# Patient Record
Sex: FEMALE | Race: WHITE | NOT HISPANIC OR LATINO | Employment: FULL TIME | URBAN - METROPOLITAN AREA
[De-identification: names, ages, dates, MRNs, and addresses within clinical notes are randomized per-mention and may not be internally consistent; named-entity substitution may affect disease eponyms.]

---

## 2023-02-28 PROBLEM — N91.2 AMENORRHEA: Status: ACTIVE | Noted: 2023-02-28

## 2023-02-28 PROBLEM — G43.909 MIGRAINE: Status: ACTIVE | Noted: 2023-02-28

## 2023-02-28 PROBLEM — N92.6 IRREGULAR BLEEDING: Status: ACTIVE | Noted: 2023-02-28

## 2023-02-28 PROBLEM — K58.9 IRRITABLE BOWEL SYNDROME (IBS): Status: ACTIVE | Noted: 2023-02-28

## 2023-02-28 PROBLEM — J30.9 ALLERGIC RHINITIS: Status: ACTIVE | Noted: 2023-02-28

## 2023-02-28 PROBLEM — L70.0 ACNE COMEDONE: Status: ACTIVE | Noted: 2023-02-28

## 2023-02-28 PROBLEM — F41.1 GENERALIZED ANXIETY DISORDER WITH PANIC ATTACKS: Status: ACTIVE | Noted: 2023-02-28

## 2023-02-28 PROBLEM — J02.9 SORE THROAT: Status: ACTIVE | Noted: 2023-02-28

## 2023-02-28 PROBLEM — H61.21 IMPACTED CERUMEN OF RIGHT EAR: Status: ACTIVE | Noted: 2023-02-28

## 2023-02-28 PROBLEM — L25.9 DERMATITIS VENENATA: Status: ACTIVE | Noted: 2023-02-28

## 2023-02-28 PROBLEM — G43.809 MIGRAINE VARIANT: Status: ACTIVE | Noted: 2023-02-28

## 2023-02-28 PROBLEM — F41.0 GENERALIZED ANXIETY DISORDER WITH PANIC ATTACKS: Status: ACTIVE | Noted: 2023-02-28

## 2023-02-28 PROBLEM — F32.A DEPRESSION: Status: ACTIVE | Noted: 2023-02-28

## 2023-02-28 PROBLEM — N93.9 ABNORMAL UTERINE BLEEDING: Status: ACTIVE | Noted: 2023-02-28

## 2023-02-28 PROBLEM — L23.5 ALLERGIC CONTACT DERMATITIS DUE TO OTHER CHEMICAL PRODUCTS: Status: ACTIVE | Noted: 2023-02-28

## 2023-02-28 PROBLEM — R41.840 ATTENTION DEFICIT: Status: ACTIVE | Noted: 2023-02-28

## 2023-02-28 PROBLEM — N92.0 MENORRHAGIA: Status: ACTIVE | Noted: 2023-02-28

## 2023-03-16 ENCOUNTER — OFFICE VISIT (OUTPATIENT)
Dept: PRIMARY CARE | Facility: CLINIC | Age: 23
End: 2023-03-16
Payer: COMMERCIAL

## 2023-03-16 VITALS
SYSTOLIC BLOOD PRESSURE: 102 MMHG | HEIGHT: 66 IN | DIASTOLIC BLOOD PRESSURE: 78 MMHG | RESPIRATION RATE: 14 BRPM | TEMPERATURE: 98.5 F | WEIGHT: 130 LBS | BODY MASS INDEX: 20.89 KG/M2 | OXYGEN SATURATION: 97 % | HEART RATE: 100 BPM

## 2023-03-16 DIAGNOSIS — S39.012A STRAIN OF LUMBAR REGION, INITIAL ENCOUNTER: Primary | ICD-10-CM

## 2023-03-16 PROCEDURE — 99213 OFFICE O/P EST LOW 20 MIN: CPT | Performed by: INTERNAL MEDICINE

## 2023-03-16 PROCEDURE — 1036F TOBACCO NON-USER: CPT | Performed by: INTERNAL MEDICINE

## 2023-03-16 RX ORDER — NAPROXEN 500 MG/1
500 TABLET ORAL 2 TIMES DAILY PRN
Qty: 60 TABLET | Refills: 0 | Status: SHIPPED | OUTPATIENT
Start: 2023-03-16 | End: 2023-04-15

## 2023-03-16 RX ORDER — CYCLOBENZAPRINE HCL 5 MG
5 TABLET ORAL NIGHTLY PRN
Qty: 30 TABLET | Refills: 0 | Status: SHIPPED | OUTPATIENT
Start: 2023-03-16 | End: 2023-04-15

## 2023-03-16 ASSESSMENT — ENCOUNTER SYMPTOMS
ABDOMINAL PAIN: 0
WEAKNESS: 0
LEG PAIN: 1
PARESTHESIAS: 1
PERIANAL NUMBNESS: 0
NUMBNESS: 0
FEVER: 0
DYSURIA: 0
WEIGHT LOSS: 0
BACK PAIN: 1
BOWEL INCONTINENCE: 0
HEADACHES: 0
TINGLING: 1
PARESIS: 0

## 2023-03-16 NOTE — PROGRESS NOTES
"Katerin Costa is a 22 y.o. female who presents for C/O LOWER BACK PAIN THAT SHOOTS DOWN R LEG X 2 MONTHS   C/O COUGH SORE THROAT X 1 WK DENIES FEVER       Back Pain  This is a recurrent problem. The current episode started more than 1 month ago. The problem occurs daily. The problem has been waxing and waning since onset. The pain is present in the lumbar spine. The quality of the pain is described as shooting and stabbing. The pain radiates to the right foot and right thigh. The pain is at a severity of 10/10. The pain is The same all the time. The symptoms are aggravated by bending, coughing, position, lying down, sitting, standing and twisting. Stiffness is present All day. Associated symptoms include leg pain, paresthesias and tingling. Pertinent negatives include no abdominal pain, bladder incontinence, bowel incontinence, chest pain, dysuria, fever, headaches, numbness, paresis, pelvic pain, perianal numbness, weakness or weight loss.      FEW MONTHS AGO, STARTED GETTING PAINS SHOOTING DOWN LEG, RIGHT LEG    NOT CONSISTENT    MOSTLY JUST LOWER BACK BUT IF GET BAD IT SHOOTS DOWN THE LEG, NO REAL WEAKNESS    NO INJURY OR HEAVY LIFTING    TAKEN ADVIL, AND EVERYTHING OTC.  WAKES HER UP AT NIGHT, HEATING PAD NOT TO EFFECTIVE    TAKE NO MEDS REGULARLY        GOING TO [place] VACATION IN 1 MONTH      Review of Systems   Constitutional:  Negative for fever and weight loss.   Cardiovascular:  Negative for chest pain.   Gastrointestinal:  Negative for abdominal pain and bowel incontinence.   Genitourinary:  Negative for bladder incontinence, dysuria and pelvic pain.   Musculoskeletal:  Positive for back pain.   Neurological:  Positive for tingling and paresthesias. Negative for weakness, numbness and headaches.       Objective   /78   Pulse 100   Temp 36.9 °C (98.5 °F)   Resp 14   Ht 1.676 m (5' 6\")   Wt 59 kg (130 lb)   SpO2 97%   BMI 20.98 kg/m²     Physical Exam  Constitutional:  "      General: She is not in acute distress.     Appearance: Normal appearance. She is not ill-appearing.   HENT:      Head: Normocephalic and atraumatic.      Mouth/Throat:      Mouth: Mucous membranes are moist.   Eyes:      Pupils: Pupils are equal, round, and reactive to light.   Neck:      Vascular: No carotid bruit.   Cardiovascular:      Rate and Rhythm: Normal rate and regular rhythm.      Pulses: Normal pulses.      Heart sounds: No murmur heard.     No gallop.   Pulmonary:      Effort: Pulmonary effort is normal. No respiratory distress.      Breath sounds: Normal breath sounds. No wheezing, rhonchi or rales.   Abdominal:      General: Bowel sounds are normal.      Palpations: Abdomen is soft.      Tenderness: There is no abdominal tenderness. There is no guarding or rebound.   Musculoskeletal:      Cervical back: Neck supple.      Right lower leg: No edema.      Left lower leg: No edema.   Skin:     General: Skin is warm and dry.      Findings: No rash.   Neurological:      General: No focal deficit present.      Mental Status: She is oriented to person, place, and time.      Motor: No weakness.      Coordination: Coordination normal.      Gait: Gait normal.      Deep Tendon Reflexes: Reflexes normal.      Comments: Bilateral lower extr 5/5 motor, 2+ and symmetric reflexes.  Mild muscle spasm in low back musculature   Psychiatric:         Mood and Affect: Mood normal.         Behavior: Behavior normal.         Assessment/Plan   Problem List Items Addressed This Visit          Musculoskeletal    Low back strain - Primary    Relevant Medications    naproxen (Naprosyn) 500 mg tablet    cyclobenzaprine (Flexeril) 5 mg tablet    Other Relevant Orders    XR lumbar spine 2-3 views     Patient Instructions    I SUSPECT THAT A LOW BACK STRAIN HAS GOTTEN OUT OF HAND.  I SUSPECT IT IS PUSHING ON A NERVE CAUSING THE PAIN DOWN THE LEG    2.  ABSENCE OF WEAKNESS AND NORMAL STRENGTH MEANS RISK OF DISC HERNIATION IS QUITE  LOW    4.  WILL CHECK AM XRAY TO MAKE SURE YOUR BACK LOOKS NORMAL    5.  ANTIINFLAMMATORY AND MUSCLE RELAXERS TO TAKE AT NIGHT ARE ORDERED    6.  IF YOUR NOT GETTING RELIEF IN A WEEK OR TWO, PLEASE LET ME KNOW AND WILL CONSIDER ONE OR MORE OF : STEROID TAPER, CHIROPRACTIC, PHYSICAL THERAPY - SO THAT WE CAN GET YOU IN GOOD SHAPE FOR YOUR TRIP    7.  MOST LIKELY THE CAUSE IS YOUR WORK STATION SO MAKE SURE TO RAISE CLIENT CHAIR AS HIGH AS NEEDED TO KEEP YOUR BACK STRAIGHT    8.  FOLLOW UP AS NEEDED.    9.  REGULAR GYN EXAMS ARE RECOMMEDNED

## 2023-03-16 NOTE — PATIENT INSTRUCTIONS
I SUSPECT THAT A LOW BACK STRAIN HAS GOTTEN OUT OF HAND.  I SUSPECT IT IS PUSHING ON A NERVE CAUSING THE PAIN DOWN THE LEG    2.  ABSENCE OF WEAKNESS AND NORMAL STRENGTH MEANS RISK OF DISC HERNIATION IS QUITE LOW    4.  WILL CHECK AM XRAY TO MAKE SURE YOUR BACK LOOKS NORMAL    5.  ANTIINFLAMMATORY AND MUSCLE RELAXERS TO TAKE AT NIGHT ARE ORDERED    6.  IF YOUR NOT GETTING RELIEF IN A WEEK OR TWO, PLEASE LET ME KNOW AND WILL CONSIDER ONE OR MORE OF : STEROID TAPER, CHIROPRACTIC, PHYSICAL THERAPY - SO THAT WE CAN GET YOU IN GOOD SHAPE FOR YOUR TRIP    7.  MOST LIKELY THE CAUSE IS YOUR WORK STATION SO MAKE SURE TO RAISE CLIENT CHAIR AS HIGH AS NEEDED TO KEEP YOUR BACK STRAIGHT    8.  FOLLOW UP AS NEEDED.    9.  REGULAR GYN EXAMS ARE RECOMMEDNED

## 2023-05-17 LAB
ERYTHROCYTE DISTRIBUTION WIDTH (RATIO) BY AUTOMATED COUNT: 11.9 % (ref 11.5–14.5)
ERYTHROCYTE MEAN CORPUSCULAR HEMOGLOBIN CONCENTRATION (G/DL) BY AUTOMATED: 32.4 G/DL (ref 32–36)
ERYTHROCYTE MEAN CORPUSCULAR VOLUME (FL) BY AUTOMATED COUNT: 92 FL (ref 80–100)
ERYTHROCYTES (10*6/UL) IN BLOOD BY AUTOMATED COUNT: 4.27 X10E12/L (ref 4–5.2)
HEMATOCRIT (%) IN BLOOD BY AUTOMATED COUNT: 39.2 % (ref 36–46)
HEMOGLOBIN (G/DL) IN BLOOD: 12.7 G/DL (ref 12–16)
LEUKOCYTES (10*3/UL) IN BLOOD BY AUTOMATED COUNT: 9.6 X10E9/L (ref 4.4–11.3)
PLATELETS (10*3/UL) IN BLOOD AUTOMATED COUNT: 248 X10E9/L (ref 150–450)
REFLEX ADDED, ANEMIA PANEL: NORMAL

## 2023-05-18 LAB
ABO GROUP (TYPE) IN BLOOD: NORMAL
ANTIBODY SCREEN: NORMAL
CHLAMYDIA TRACH., AMPLIFIED: NEGATIVE
HEPATITIS B VIRUS SURFACE AG PRESENCE IN SERUM: NONREACTIVE
HEPATITIS C VIRUS AB PRESENCE IN SERUM: NONREACTIVE
HIV 1/ 2 AG/AB SCREEN: NONREACTIVE
N. GONORRHEA, AMPLIFIED: NEGATIVE
RH FACTOR: NORMAL
RUBELLA VIRUS IGG AB: POSITIVE
SYPHILIS TOTAL AB: NONREACTIVE
URINE CULTURE: NORMAL

## 2023-09-13 LAB
ERYTHROCYTE DISTRIBUTION WIDTH (RATIO) BY AUTOMATED COUNT: 12.5 % (ref 11.5–14.5)
ERYTHROCYTE MEAN CORPUSCULAR HEMOGLOBIN CONCENTRATION (G/DL) BY AUTOMATED: 31.5 G/DL (ref 32–36)
ERYTHROCYTE MEAN CORPUSCULAR VOLUME (FL) BY AUTOMATED COUNT: 95 FL (ref 80–100)
ERYTHROCYTES (10*6/UL) IN BLOOD BY AUTOMATED COUNT: 3.32 X10E12/L (ref 4–5.2)
GLUCOSE, 1 HR SCREEN, PREG: 112 MG/DL
HEMATOCRIT (%) IN BLOOD BY AUTOMATED COUNT: 31.7 % (ref 36–46)
HEMOGLOBIN (G/DL) IN BLOOD: 10 G/DL (ref 12–16)
LEUKOCYTES (10*3/UL) IN BLOOD BY AUTOMATED COUNT: 10.7 X10E9/L (ref 4.4–11.3)
PLATELETS (10*3/UL) IN BLOOD AUTOMATED COUNT: 234 X10E9/L (ref 150–450)
REFLEX ADDED, ANEMIA PANEL: ABNORMAL

## 2023-09-14 LAB
FERRITIN, PREGNANCY: 18 UG/L
FOLATE, SERUM, PREGNANCY: 4.2 NG/ML
IRON (UG/DL) IN SER/PLAS IN PREGNANCY: 20 UG/DL
IRON BINDING CAPACITY (UG/DL) IN PREGNANCY: >470 UG/DL
IRON SATURATION (%) IN PREGNANCY: ABNORMAL %
VITAMIN B12, PREGNANCY: 153 PG/ML

## 2023-09-21 ENCOUNTER — HOSPITAL ENCOUNTER (OUTPATIENT)
Dept: DATA CONVERSION | Facility: HOSPITAL | Age: 23
End: 2023-09-21
Attending: OBSTETRICS & GYNECOLOGY
Payer: COMMERCIAL

## 2023-09-21 DIAGNOSIS — Z34.80 ENCOUNTER FOR SUPERVISION OF OTHER NORMAL PREGNANCY, UNSPECIFIED TRIMESTER (HHS-HCC): ICD-10-CM

## 2023-09-21 DIAGNOSIS — O26.893 OTHER SPECIFIED PREGNANCY RELATED CONDITIONS, THIRD TRIMESTER (HHS-HCC): ICD-10-CM

## 2023-09-21 DIAGNOSIS — Z3A.28 28 WEEKS GESTATION OF PREGNANCY (HHS-HCC): ICD-10-CM

## 2023-09-21 DIAGNOSIS — O99.013 ANEMIA COMPLICATING PREGNANCY, THIRD TRIMESTER (HHS-HCC): ICD-10-CM

## 2023-09-21 DIAGNOSIS — D64.9 ANEMIA, UNSPECIFIED: ICD-10-CM

## 2023-09-21 DIAGNOSIS — R10.9 UNSPECIFIED ABDOMINAL PAIN: ICD-10-CM

## 2023-09-21 DIAGNOSIS — O26.853 SPOTTING COMPLICATING PREGNANCY, THIRD TRIMESTER (HHS-HCC): ICD-10-CM

## 2023-09-21 LAB
ALANINE AMINOTRANSFERASE (SGPT) (U/L) IN SER/PLAS: 9 U/L (ref 7–45)
ALBUMIN (G/DL) IN SER/PLAS: 3.7 G/DL (ref 3.4–5)
ALKALINE PHOSPHATASE (U/L) IN SER/PLAS: 81 U/L (ref 33–110)
ANION GAP IN SER/PLAS: 12 MMOL/L (ref 10–20)
APPEARANCE, URINE: ABNORMAL
ASPARTATE AMINOTRANSFERASE (SGOT) (U/L) IN SER/PLAS: 12 U/L (ref 9–39)
BACTERIA, URINE: ABNORMAL /HPF
BASOPHILS (10*3/UL) IN BLOOD BY AUTOMATED COUNT: 0.06 X10E9/L (ref 0–0.1)
BASOPHILS/100 LEUKOCYTES IN BLOOD BY AUTOMATED COUNT: 0.4 % (ref 0–2)
BILIRUBIN TOTAL (MG/DL) IN SER/PLAS: 0.3 MG/DL (ref 0–1.2)
BILIRUBIN, URINE: NEGATIVE
BLOOD, URINE: ABNORMAL
BUDDING YEAST, URINE: PRESENT /HPF
CALCIUM (MG/DL) IN SER/PLAS: 9 MG/DL (ref 8.6–10.3)
CARBON DIOXIDE, TOTAL (MMOL/L) IN SER/PLAS: 25 MMOL/L (ref 21–32)
CHLORIDE (MMOL/L) IN SER/PLAS: 104 MMOL/L (ref 98–107)
COLOR, URINE: YELLOW
CREATININE (MG/DL) IN SER/PLAS: 0.55 MG/DL (ref 0.5–1.05)
EOSINOPHILS (10*3/UL) IN BLOOD BY AUTOMATED COUNT: 0.03 X10E9/L (ref 0–0.7)
EOSINOPHILS/100 LEUKOCYTES IN BLOOD BY AUTOMATED COUNT: 0.2 % (ref 0–6)
ERYTHROCYTE DISTRIBUTION WIDTH (RATIO) BY AUTOMATED COUNT: 12.3 % (ref 11.5–14.5)
ERYTHROCYTE MEAN CORPUSCULAR HEMOGLOBIN CONCENTRATION (G/DL) BY AUTOMATED: 32.6 G/DL (ref 32–36)
ERYTHROCYTE MEAN CORPUSCULAR VOLUME (FL) BY AUTOMATED COUNT: 92 FL (ref 80–100)
ERYTHROCYTES (10*6/UL) IN BLOOD BY AUTOMATED COUNT: 3.58 X10E12/L (ref 4–5.2)
GFR FEMALE: >90 ML/MIN/1.73M2
GLUCOSE (MG/DL) IN SER/PLAS: 89 MG/DL (ref 74–99)
GLUCOSE, URINE: NEGATIVE MG/DL
HEMATOCRIT (%) IN BLOOD BY AUTOMATED COUNT: 32.8 % (ref 36–46)
HEMOGLOBIN (G/DL) IN BLOOD: 10.7 G/DL (ref 12–16)
IMMATURE GRANULOCYTES/100 LEUKOCYTES IN BLOOD BY AUTOMATED COUNT: 0.6 % (ref 0–0.9)
KETONES, URINE: NEGATIVE MG/DL
LEUKOCYTE ESTERASE, URINE: NEGATIVE
LEUKOCYTES (10*3/UL) IN BLOOD BY AUTOMATED COUNT: 16.2 X10E9/L (ref 4.4–11.3)
LYMPHOCYTES (10*3/UL) IN BLOOD BY AUTOMATED COUNT: 1.52 X10E9/L (ref 1.2–4.8)
LYMPHOCYTES/100 LEUKOCYTES IN BLOOD BY AUTOMATED COUNT: 9.4 % (ref 13–44)
MONOCYTES (10*3/UL) IN BLOOD BY AUTOMATED COUNT: 1.12 X10E9/L (ref 0.1–1)
MONOCYTES/100 LEUKOCYTES IN BLOOD BY AUTOMATED COUNT: 6.9 % (ref 2–10)
NEUTROPHILS (10*3/UL) IN BLOOD BY AUTOMATED COUNT: 13.42 X10E9/L (ref 1.2–7.7)
NEUTROPHILS/100 LEUKOCYTES IN BLOOD BY AUTOMATED COUNT: 82.5 % (ref 40–80)
NITRITE, URINE: NEGATIVE
NRBC (PER 100 WBCS) BY AUTOMATED COUNT: 0 /100 WBC (ref 0–0)
PH, URINE: 7 (ref 5–8)
PLATELETS (10*3/UL) IN BLOOD AUTOMATED COUNT: 238 X10E9/L (ref 150–450)
POTASSIUM (MMOL/L) IN SER/PLAS: 3.5 MMOL/L (ref 3.5–5.3)
PROTEIN TOTAL: 6.7 G/DL (ref 6.4–8.2)
PROTEIN, URINE: NEGATIVE MG/DL
RBC, URINE: >100 /HPF (ref 0–5)
SODIUM (MMOL/L) IN SER/PLAS: 137 MMOL/L (ref 136–145)
SPECIFIC GRAVITY, URINE: 1.01 (ref 1–1.03)
SQUAMOUS EPITHELIAL CELLS, URINE: 35 /HPF
UREA NITROGEN (MG/DL) IN SER/PLAS: 9 MG/DL (ref 6–23)
UROBILINOGEN, URINE: <2 MG/DL (ref 0–1.9)
WBC, URINE: ABNORMAL /HPF (ref 0–5)

## 2023-09-29 VITALS — HEIGHT: 66 IN | BODY MASS INDEX: 25.12 KG/M2 | WEIGHT: 156.31 LBS

## 2023-09-30 NOTE — PROGRESS NOTES
Current Stage:   Stage: Triage     Subjective Data:   Antepartum:  Antepartum:    24 yo  at 28.1 wk present with left flank pain radiating to groin since last night.  It is constant in nature.  No fever/chills/vomiting/dysuria/constipation/diarrhea/hematuria.   No h/o kidney stones or UTI.  Was nauseated and took zofran pain is 8/10.  No h/o injury.  Recently started on iron for anemia.  Had chest pain and dyspnea last week and negative CT PE.  Spotted light red blood when wiping this morning, no leakage of  fluid or contractions.  Endorses good fetal movement.    PMH:depression, anemia  PSH:wrist surgery  Meds: iron  NKDA  Soc: no etoh/cigs/recreational drugs          Objective Information:    Objective Information:      T   P  R  BP   MAP  SpO2   Value     104  18  132/72   94  93%  Date/Time    13:10  13:01  13:01   13:01  13:10  Range     (104 - 121 )  (18 - 18 )  (132 - 132 )/ (72 - 72 )  (94 - 94 )  (91% - 96% )      Physical Exam:   Constitutional: alert, oriented   Obstetric: gravid nontender  sterile speculum exam in office LTC, no blood  baseline fhr 135, category I, moderate variability, positive accelerations no decelerations   occ ctx   Head/Neck: supple good rom   Respiratory/Thorax: clr b/l   Cardiovascular: tachycardic, regular no murmur   Gastrointestinal: soft nontender, no fundal tenderness,  no cva tenderness   Extremities: no edema, no calf tenderness     Assessment and Plan:   Assessment:    24 yo  with left flank pain, suspect MSI.    -cbc with dfif, CMP  -UA  -left renal u/s  -flexeril  -reassuring fetal testing, no sx's ptl, rh pos    Update:  no further bleeding  rechecked cvx 1600 LTC no blood on glove  renal u/s likely physiologic mild hydro on left more severe on right no stone  urine culture sent  will discharge home with instructions to follow up if symptoms reoccur or worsen      Electronic Signatures:  Vilma Ron)  (Signed 21-Sep-2023  16:42)   Authored: Current Stage, Subjective Data, Objective Data,  Assessment and Plan, Note Completion      Last Updated: 21-Sep-2023 16:42 by Vlima Ron)

## 2023-10-10 ENCOUNTER — ROUTINE PRENATAL (OUTPATIENT)
Dept: OBSTETRICS AND GYNECOLOGY | Facility: CLINIC | Age: 23
End: 2023-10-10
Payer: COMMERCIAL

## 2023-10-10 VITALS
WEIGHT: 159.25 LBS | BODY MASS INDEX: 25.78 KG/M2 | SYSTOLIC BLOOD PRESSURE: 122 MMHG | DIASTOLIC BLOOD PRESSURE: 82 MMHG

## 2023-10-10 DIAGNOSIS — Z3A.30 30 WEEKS GESTATION OF PREGNANCY (HHS-HCC): Primary | ICD-10-CM

## 2023-10-10 PROCEDURE — 0501F PRENATAL FLOW SHEET: CPT | Performed by: ADVANCED PRACTICE MIDWIFE

## 2023-10-10 RX ORDER — FERROUS SULFATE 325(65) MG
65 TABLET, DELAYED RELEASE (ENTERIC COATED) ORAL
COMMUNITY
End: 2024-01-02 | Stop reason: ALTCHOICE

## 2023-10-10 NOTE — PROGRESS NOTES
Subjective   Patient ID [id number]   Dary Costa is a 23 y.o.  at 30w6d with a working estimated date of delivery of 2023, by Ultrasound who presents for a routine prenatal visit. She denies vaginal bleeding, leakage of fluid, decreased fetal movements, or contractions.    Her pregnancy is complicated by:  Depression    Objective   Physical Exam:   Weight: 72.2 kg (159 lb 4 oz)  BP: 122/82  Fetal Heart Rate: 138 Fundal Height (cm): 31 cm           Prenatal Labs  Urine Dip:  Lab Results   Component Value Date    KETONESU NEGATIVE 2023     Lab Results   Component Value Date    HGB 10.7 (L) 2023    HCT 32.8 (L) 2023    HEPBSAG NONREACTIVE 2023        Assessment/Plan   Discussed and encouraged Covid booster, Flu & RSV vaccine, pt verbalizes understanding but declines.   Continue prenatal vitamin.  Expected mode of delivery: Vaginal  Follow up in 2 weeks for a routine prenatal visit.

## 2023-10-24 ENCOUNTER — ROUTINE PRENATAL (OUTPATIENT)
Dept: OBSTETRICS AND GYNECOLOGY | Facility: CLINIC | Age: 23
End: 2023-10-24
Payer: COMMERCIAL

## 2023-10-24 VITALS — WEIGHT: 163.13 LBS | SYSTOLIC BLOOD PRESSURE: 110 MMHG | DIASTOLIC BLOOD PRESSURE: 78 MMHG | BODY MASS INDEX: 26.4 KG/M2

## 2023-10-24 DIAGNOSIS — O99.013 MATERNAL IRON DEFICIENCY ANEMIA AFFECTING PREGNANCY IN THIRD TRIMESTER, ANTEPARTUM (HHS-HCC): Primary | ICD-10-CM

## 2023-10-24 DIAGNOSIS — D50.9 MATERNAL IRON DEFICIENCY ANEMIA AFFECTING PREGNANCY IN THIRD TRIMESTER, ANTEPARTUM (HHS-HCC): Primary | ICD-10-CM

## 2023-10-24 DIAGNOSIS — Z3A.32 32 WEEKS GESTATION OF PREGNANCY (HHS-HCC): ICD-10-CM

## 2023-10-24 PROBLEM — H61.21 IMPACTED CERUMEN OF RIGHT EAR: Status: RESOLVED | Noted: 2023-02-28 | Resolved: 2023-10-24

## 2023-10-24 PROBLEM — N92.6 IRREGULAR BLEEDING: Status: RESOLVED | Noted: 2023-02-28 | Resolved: 2023-10-24

## 2023-10-24 PROBLEM — K58.9 IRRITABLE BOWEL SYNDROME (IBS): Status: RESOLVED | Noted: 2023-02-28 | Resolved: 2023-10-24

## 2023-10-24 PROBLEM — Z34.03 ENCOUNTER FOR SUPERVISION OF NORMAL FIRST PREGNANCY IN THIRD TRIMESTER (HHS-HCC): Status: ACTIVE | Noted: 2023-10-24

## 2023-10-24 PROBLEM — J02.9 SORE THROAT: Status: RESOLVED | Noted: 2023-02-28 | Resolved: 2023-10-24

## 2023-10-24 PROBLEM — N91.2 AMENORRHEA: Status: RESOLVED | Noted: 2023-02-28 | Resolved: 2023-10-24

## 2023-10-24 PROBLEM — J30.9 ALLERGIC RHINITIS: Status: RESOLVED | Noted: 2023-02-28 | Resolved: 2023-10-24

## 2023-10-24 PROBLEM — N93.9 ABNORMAL UTERINE BLEEDING: Status: RESOLVED | Noted: 2023-02-28 | Resolved: 2023-10-24

## 2023-10-24 PROBLEM — N92.0 MENORRHAGIA: Status: RESOLVED | Noted: 2023-02-28 | Resolved: 2023-10-24

## 2023-10-24 PROCEDURE — 0501F PRENATAL FLOW SHEET: CPT

## 2023-10-24 NOTE — PROGRESS NOTES
Subjective     Dary Costa is a 23 y.o.  at 32w6d with a working estimated date of delivery of 2023, by Ultrasound who presents for a routine prenatal visit. She denies vaginal bleeding, leakage of fluid, decreased fetal movements, or contractions.    Patient has iron deficiency anemia in pregnancy and repeat labs not yet obtained. Patient reports good adherence to iron. Orders placed today    Her pregnancy is complicated by:  Medical Problems       Problem List       Abnormal uterine bleeding    Acne comedone    Allergic rhinitis    Amenorrhea    Attention deficit    Depression    Dermatitis venenata    Generalized anxiety disorder with panic attacks    Impacted cerumen of right ear    Irregular bleeding    Irritable bowel syndrome (IBS)    Menorrhagia    Migraine variant    Migraine    Sore throat    Allergic contact dermatitis due to other chemical products    Low back strain          Objective   Weight: 74 kg (163 lb 2 oz)  TWG: Not found.  Pregravid BMI: Could not be calculated  BP: 110/78      Prenatal Labs  Lab Results   Component Value Date    HGB 10.7 (L) 2023    HCT 32.8 (L) 2023    ABO O 2023    HEPBSAG NONREACTIVE 2023       Assessment/Plan   Reviewed s/sx of PTL, warning signs, fetal movement counts, and when to call provider  Repeat CBC and retic order placed and patient instructed to have drawn  Follow up in 2 week for a routine prenatal visit.    ALEJA Murphy

## 2023-10-25 ENCOUNTER — LAB (OUTPATIENT)
Dept: LAB | Facility: LAB | Age: 23
End: 2023-10-25
Payer: COMMERCIAL

## 2023-10-25 DIAGNOSIS — O99.013 MATERNAL IRON DEFICIENCY ANEMIA AFFECTING PREGNANCY IN THIRD TRIMESTER, ANTEPARTUM (HHS-HCC): ICD-10-CM

## 2023-10-25 DIAGNOSIS — D50.9 MATERNAL IRON DEFICIENCY ANEMIA AFFECTING PREGNANCY IN THIRD TRIMESTER, ANTEPARTUM (HHS-HCC): ICD-10-CM

## 2023-10-25 LAB
ERYTHROCYTE [DISTWIDTH] IN BLOOD BY AUTOMATED COUNT: 14 % (ref 11.5–14.5)
HCT VFR BLD AUTO: 34.6 % (ref 36–46)
HGB BLD-MCNC: 11.2 G/DL (ref 12–16)
HGB RETIC QN: 34 PG (ref 28–38)
IMMATURE RETIC FRACTION: 23.9 %
MCH RBC QN AUTO: 29.6 PG (ref 26–34)
MCHC RBC AUTO-ENTMCNC: 32.4 G/DL (ref 32–36)
MCV RBC AUTO: 92 FL (ref 80–100)
NRBC BLD-RTO: 0 /100 WBCS (ref 0–0)
PLATELET # BLD AUTO: 206 X10*3/UL (ref 150–450)
PMV BLD AUTO: 10.6 FL (ref 7.5–11.5)
RBC # BLD AUTO: 3.78 X10*6/UL (ref 4–5.2)
REFLEX ADDED, ANEMIA PANEL: NORMAL
RETICS #: 0.08 X10*6/UL (ref 0.02–0.08)
RETICS/RBC NFR AUTO: 2.1 % (ref 0.5–2)
WBC # BLD AUTO: 12.6 X10*3/UL (ref 4.4–11.3)

## 2023-10-25 PROCEDURE — 85045 AUTOMATED RETICULOCYTE COUNT: CPT

## 2023-10-25 PROCEDURE — 36415 COLL VENOUS BLD VENIPUNCTURE: CPT

## 2023-10-25 PROCEDURE — 85027 COMPLETE CBC AUTOMATED: CPT

## 2023-10-26 ENCOUNTER — TELEPHONE (OUTPATIENT)
Dept: OBSTETRICS AND GYNECOLOGY | Facility: CLINIC | Age: 23
End: 2023-10-26
Payer: COMMERCIAL

## 2023-10-26 NOTE — TELEPHONE ENCOUNTER
----- Message from Maryann Carpenter, APRN-CNM sent at 10/25/2023  7:06 PM EDT -----  Please let Dary know that her body is responding well to the iron and she should keep up the good work

## 2023-11-06 ENCOUNTER — ROUTINE PRENATAL (OUTPATIENT)
Dept: OBSTETRICS AND GYNECOLOGY | Facility: CLINIC | Age: 23
End: 2023-11-06
Payer: COMMERCIAL

## 2023-11-06 VITALS
BODY MASS INDEX: 27.05 KG/M2 | WEIGHT: 167.13 LBS | SYSTOLIC BLOOD PRESSURE: 134 MMHG | DIASTOLIC BLOOD PRESSURE: 84 MMHG

## 2023-11-06 DIAGNOSIS — O21.9 NAUSEA AND VOMITING IN PREGNANCY PRIOR TO 22 WEEKS GESTATION (HHS-HCC): Primary | ICD-10-CM

## 2023-11-06 DIAGNOSIS — Z34.03 ENCOUNTER FOR SUPERVISION OF NORMAL FIRST PREGNANCY IN THIRD TRIMESTER (HHS-HCC): ICD-10-CM

## 2023-11-06 DIAGNOSIS — Z3A.34 34 WEEKS GESTATION OF PREGNANCY (HHS-HCC): ICD-10-CM

## 2023-11-06 PROCEDURE — 0501F PRENATAL FLOW SHEET: CPT | Performed by: OBSTETRICS & GYNECOLOGY

## 2023-11-06 RX ORDER — ONDANSETRON 4 MG/1
4 TABLET, FILM COATED ORAL EVERY 12 HOURS PRN
Qty: 14 TABLET | Refills: 0 | Status: SHIPPED | OUTPATIENT
Start: 2023-11-06 | End: 2023-11-13

## 2023-11-06 RX ORDER — ACETAMINOPHEN 500 MG
500 TABLET ORAL EVERY 6 HOURS PRN
COMMUNITY
End: 2023-12-15 | Stop reason: HOSPADM

## 2023-11-06 RX ORDER — ONDANSETRON 4 MG/1
4 TABLET, FILM COATED ORAL EVERY 8 HOURS PRN
COMMUNITY
End: 2023-12-05 | Stop reason: SDUPTHER

## 2023-11-06 NOTE — PROGRESS NOTES
Routine ob at 34.5 wks.  Overall feeling well.  Good FM.  Reassured regarding hiccups.  BP normal, having mild LE swelling that is equal.  Desires growth scan, order placed.  RTC 2 wks, gbs/sve then.

## 2023-11-16 ENCOUNTER — ANCILLARY PROCEDURE (OUTPATIENT)
Dept: RADIOLOGY | Facility: CLINIC | Age: 23
End: 2023-11-16
Payer: COMMERCIAL

## 2023-11-16 DIAGNOSIS — Z34.90 ENCOUNTER FOR SUPERVISION OF NORMAL PREGNANCY, UNSPECIFIED, UNSPECIFIED TRIMESTER (HHS-HCC): ICD-10-CM

## 2023-11-16 DIAGNOSIS — Z34.03 ENCOUNTER FOR SUPERVISION OF NORMAL FIRST PREGNANCY IN THIRD TRIMESTER (HHS-HCC): ICD-10-CM

## 2023-11-16 PROCEDURE — 76819 FETAL BIOPHYS PROFIL W/O NST: CPT | Performed by: OBSTETRICS & GYNECOLOGY

## 2023-11-16 PROCEDURE — 76816 OB US FOLLOW-UP PER FETUS: CPT | Performed by: OBSTETRICS & GYNECOLOGY

## 2023-11-16 PROCEDURE — 76816 OB US FOLLOW-UP PER FETUS: CPT

## 2023-11-17 ENCOUNTER — ROUTINE PRENATAL (OUTPATIENT)
Dept: OBSTETRICS AND GYNECOLOGY | Facility: CLINIC | Age: 23
End: 2023-11-17
Payer: COMMERCIAL

## 2023-11-17 VITALS
DIASTOLIC BLOOD PRESSURE: 88 MMHG | WEIGHT: 170.38 LBS | BODY MASS INDEX: 27.58 KG/M2 | SYSTOLIC BLOOD PRESSURE: 136 MMHG

## 2023-11-17 DIAGNOSIS — Z34.03 ENCOUNTER FOR SUPERVISION OF NORMAL FIRST PREGNANCY IN THIRD TRIMESTER (HHS-HCC): Primary | ICD-10-CM

## 2023-11-17 DIAGNOSIS — Z3A.36 36 WEEKS GESTATION OF PREGNANCY (HHS-HCC): ICD-10-CM

## 2023-11-17 PROCEDURE — 0501F PRENATAL FLOW SHEET: CPT | Performed by: OBSTETRICS & GYNECOLOGY

## 2023-11-17 PROCEDURE — 87081 CULTURE SCREEN ONLY: CPT

## 2023-11-20 LAB — GP B STREP GENITAL QL CULT: NORMAL

## 2023-11-21 ENCOUNTER — ROUTINE PRENATAL (OUTPATIENT)
Dept: OBSTETRICS AND GYNECOLOGY | Facility: CLINIC | Age: 23
End: 2023-11-21
Payer: COMMERCIAL

## 2023-11-21 VITALS — DIASTOLIC BLOOD PRESSURE: 78 MMHG | BODY MASS INDEX: 27.27 KG/M2 | SYSTOLIC BLOOD PRESSURE: 132 MMHG | WEIGHT: 168.5 LBS

## 2023-11-21 DIAGNOSIS — Z3A.36 36 WEEKS GESTATION OF PREGNANCY (HHS-HCC): Primary | ICD-10-CM

## 2023-11-21 DIAGNOSIS — Z34.03 ENCOUNTER FOR SUPERVISION OF NORMAL FIRST PREGNANCY IN THIRD TRIMESTER (HHS-HCC): ICD-10-CM

## 2023-11-21 PROCEDURE — 0501F PRENATAL FLOW SHEET: CPT | Performed by: OBSTETRICS & GYNECOLOGY

## 2023-11-29 ENCOUNTER — ROUTINE PRENATAL (OUTPATIENT)
Dept: OBSTETRICS AND GYNECOLOGY | Facility: CLINIC | Age: 23
End: 2023-11-29
Payer: COMMERCIAL

## 2023-11-29 VITALS — BODY MASS INDEX: 27.58 KG/M2 | DIASTOLIC BLOOD PRESSURE: 80 MMHG | WEIGHT: 170.4 LBS | SYSTOLIC BLOOD PRESSURE: 126 MMHG

## 2023-11-29 DIAGNOSIS — Z3A.38 38 WEEKS GESTATION OF PREGNANCY (HHS-HCC): Primary | ICD-10-CM

## 2023-11-29 PROCEDURE — 0501F PRENATAL FLOW SHEET: CPT | Performed by: OBSTETRICS & GYNECOLOGY

## 2023-11-29 NOTE — PROGRESS NOTES
Routine ob at 38.0 wks. Feeling well. Good FM.  Bp remains normal. Desires spontaneous labor for now.  RTC 1 wk, may want her membranes stripped n/v.

## 2023-12-05 ENCOUNTER — PREP FOR PROCEDURE (OUTPATIENT)
Dept: OBSTETRICS AND GYNECOLOGY | Facility: CLINIC | Age: 23
End: 2023-12-05

## 2023-12-05 ENCOUNTER — ROUTINE PRENATAL (OUTPATIENT)
Dept: OBSTETRICS AND GYNECOLOGY | Facility: CLINIC | Age: 23
End: 2023-12-05
Payer: COMMERCIAL

## 2023-12-05 VITALS
SYSTOLIC BLOOD PRESSURE: 126 MMHG | WEIGHT: 169.38 LBS | BODY MASS INDEX: 27.42 KG/M2 | DIASTOLIC BLOOD PRESSURE: 88 MMHG

## 2023-12-05 DIAGNOSIS — O21.9 NAUSEA AND VOMITING IN PREGNANCY PRIOR TO 22 WEEKS GESTATION (HHS-HCC): Primary | ICD-10-CM

## 2023-12-05 DIAGNOSIS — Z3A.38 38 WEEKS GESTATION OF PREGNANCY (HHS-HCC): Primary | ICD-10-CM

## 2023-12-05 DIAGNOSIS — Z34.03 ENCOUNTER FOR SUPERVISION OF NORMAL FIRST PREGNANCY IN THIRD TRIMESTER (HHS-HCC): Primary | ICD-10-CM

## 2023-12-05 PROCEDURE — 0501F PRENATAL FLOW SHEET: CPT | Performed by: OBSTETRICS & GYNECOLOGY

## 2023-12-05 RX ORDER — ONDANSETRON 4 MG/1
4 TABLET, FILM COATED ORAL EVERY 8 HOURS PRN
Qty: 20 TABLET | Refills: 2 | Status: SHIPPED | OUTPATIENT
Start: 2023-12-05 | End: 2023-12-15 | Stop reason: HOSPADM

## 2023-12-05 NOTE — PROGRESS NOTES
Routine ob at 38.6 wks.  Feeling well but tired and nauseous.  SVE 2/80/-2, vertex, membranes stripped.  Desires IOL, scheduled for 12/8 at 8 pm.  RTC for postpartum visit.

## 2023-12-08 ENCOUNTER — ANESTHESIA (OUTPATIENT)
Dept: OBSTETRICS AND GYNECOLOGY | Facility: HOSPITAL | Age: 23
End: 2023-12-08
Payer: COMMERCIAL

## 2023-12-08 ENCOUNTER — ANESTHESIA EVENT (OUTPATIENT)
Dept: OBSTETRICS AND GYNECOLOGY | Facility: HOSPITAL | Age: 23
End: 2023-12-08
Payer: COMMERCIAL

## 2023-12-08 ENCOUNTER — PREP FOR PROCEDURE (OUTPATIENT)
Dept: OBSTETRICS AND GYNECOLOGY | Facility: HOSPITAL | Age: 23
End: 2023-12-08

## 2023-12-08 ENCOUNTER — APPOINTMENT (OUTPATIENT)
Dept: OBSTETRICS AND GYNECOLOGY | Facility: HOSPITAL | Age: 23
End: 2023-12-08
Payer: COMMERCIAL

## 2023-12-08 ENCOUNTER — HOSPITAL ENCOUNTER (INPATIENT)
Facility: HOSPITAL | Age: 23
LOS: 7 days | Discharge: HOME | End: 2023-12-15
Attending: OBSTETRICS & GYNECOLOGY | Admitting: STUDENT IN AN ORGANIZED HEALTH CARE EDUCATION/TRAINING PROGRAM
Payer: COMMERCIAL

## 2023-12-08 DIAGNOSIS — O14.90 PRE-ECLAMPSIA, ANTEPARTUM (HHS-HCC): ICD-10-CM

## 2023-12-08 DIAGNOSIS — Z34.03 ENCOUNTER FOR SUPERVISION OF NORMAL FIRST PREGNANCY IN THIRD TRIMESTER (HHS-HCC): ICD-10-CM

## 2023-12-08 PROBLEM — Z3A.39 39 WEEKS GESTATION OF PREGNANCY (HHS-HCC): Status: ACTIVE | Noted: 2023-12-08

## 2023-12-08 LAB
ABO GROUP (TYPE) IN BLOOD: NORMAL
ANTIBODY SCREEN: NORMAL
ERYTHROCYTE [DISTWIDTH] IN BLOOD BY AUTOMATED COUNT: 13.5 % (ref 11.5–14.5)
HCT VFR BLD AUTO: 34.5 % (ref 36–46)
HGB BLD-MCNC: 11.3 G/DL (ref 12–16)
MCH RBC QN AUTO: 28.9 PG (ref 26–34)
MCHC RBC AUTO-ENTMCNC: 32.8 G/DL (ref 32–36)
MCV RBC AUTO: 88 FL (ref 80–100)
NRBC BLD-RTO: 0 /100 WBCS (ref 0–0)
PLATELET # BLD AUTO: 207 X10*3/UL (ref 150–450)
RBC # BLD AUTO: 3.91 X10*6/UL (ref 4–5.2)
RH FACTOR (ANTIGEN D): NORMAL
WBC # BLD AUTO: 14 X10*3/UL (ref 4.4–11.3)

## 2023-12-08 PROCEDURE — 7210000002 HC LABOR PER HOUR

## 2023-12-08 PROCEDURE — 2500000001 HC RX 250 WO HCPCS SELF ADMINISTERED DRUGS (ALT 637 FOR MEDICARE OP): Performed by: STUDENT IN AN ORGANIZED HEALTH CARE EDUCATION/TRAINING PROGRAM

## 2023-12-08 PROCEDURE — 36415 COLL VENOUS BLD VENIPUNCTURE: CPT | Performed by: STUDENT IN AN ORGANIZED HEALTH CARE EDUCATION/TRAINING PROGRAM

## 2023-12-08 PROCEDURE — 80053 COMPREHEN METABOLIC PANEL: CPT | Performed by: STUDENT IN AN ORGANIZED HEALTH CARE EDUCATION/TRAINING PROGRAM

## 2023-12-08 PROCEDURE — 86780 TREPONEMA PALLIDUM: CPT | Mod: STJLAB | Performed by: STUDENT IN AN ORGANIZED HEALTH CARE EDUCATION/TRAINING PROGRAM

## 2023-12-08 PROCEDURE — 3E0P7VZ INTRODUCTION OF HORMONE INTO FEMALE REPRODUCTIVE, VIA NATURAL OR ARTIFICIAL OPENING: ICD-10-PCS | Performed by: STUDENT IN AN ORGANIZED HEALTH CARE EDUCATION/TRAINING PROGRAM

## 2023-12-08 PROCEDURE — 83735 ASSAY OF MAGNESIUM: CPT | Mod: STJLAB | Performed by: STUDENT IN AN ORGANIZED HEALTH CARE EDUCATION/TRAINING PROGRAM

## 2023-12-08 PROCEDURE — 86920 COMPATIBILITY TEST SPIN: CPT

## 2023-12-08 PROCEDURE — 86850 RBC ANTIBODY SCREEN: CPT | Performed by: STUDENT IN AN ORGANIZED HEALTH CARE EDUCATION/TRAINING PROGRAM

## 2023-12-08 PROCEDURE — 86900 BLOOD TYPING SEROLOGIC ABO: CPT | Performed by: STUDENT IN AN ORGANIZED HEALTH CARE EDUCATION/TRAINING PROGRAM

## 2023-12-08 PROCEDURE — 85027 COMPLETE CBC AUTOMATED: CPT | Performed by: STUDENT IN AN ORGANIZED HEALTH CARE EDUCATION/TRAINING PROGRAM

## 2023-12-08 PROCEDURE — 84550 ASSAY OF BLOOD/URIC ACID: CPT | Performed by: STUDENT IN AN ORGANIZED HEALTH CARE EDUCATION/TRAINING PROGRAM

## 2023-12-08 PROCEDURE — 83615 LACTATE (LD) (LDH) ENZYME: CPT | Performed by: STUDENT IN AN ORGANIZED HEALTH CARE EDUCATION/TRAINING PROGRAM

## 2023-12-08 PROCEDURE — 1120000001 HC OB PRIVATE ROOM DAILY

## 2023-12-08 RX ORDER — ONDANSETRON 4 MG/1
4 TABLET, FILM COATED ORAL EVERY 6 HOURS PRN
Status: DISCONTINUED | OUTPATIENT
Start: 2023-12-08 | End: 2023-12-10

## 2023-12-08 RX ORDER — MISOPROSTOL 200 UG/1
800 TABLET ORAL ONCE AS NEEDED
Status: DISCONTINUED | OUTPATIENT
Start: 2023-12-08 | End: 2023-12-10

## 2023-12-08 RX ORDER — TRANEXAMIC ACID 100 MG/ML
1000 INJECTION, SOLUTION INTRAVENOUS ONCE AS NEEDED
Status: COMPLETED | OUTPATIENT
Start: 2023-12-08 | End: 2023-12-10

## 2023-12-08 RX ORDER — NALBUPHINE HYDROCHLORIDE 10 MG/ML
10 INJECTION, SOLUTION INTRAMUSCULAR; INTRAVENOUS; SUBCUTANEOUS
Status: DISCONTINUED | OUTPATIENT
Start: 2023-12-08 | End: 2023-12-10

## 2023-12-08 RX ORDER — OXYTOCIN/0.9 % SODIUM CHLORIDE 30/500 ML
60 PLASTIC BAG, INJECTION (ML) INTRAVENOUS ONCE AS NEEDED
Status: DISCONTINUED | OUTPATIENT
Start: 2023-12-08 | End: 2023-12-10

## 2023-12-08 RX ORDER — LABETALOL HYDROCHLORIDE 5 MG/ML
20 INJECTION, SOLUTION INTRAVENOUS ONCE AS NEEDED
Status: DISCONTINUED | OUTPATIENT
Start: 2023-12-08 | End: 2023-12-10

## 2023-12-08 RX ORDER — METOCLOPRAMIDE 10 MG/1
10 TABLET ORAL EVERY 6 HOURS PRN
Status: DISCONTINUED | OUTPATIENT
Start: 2023-12-08 | End: 2023-12-10

## 2023-12-08 RX ORDER — METHYLERGONOVINE MALEATE 0.2 MG/ML
0.2 INJECTION INTRAVENOUS ONCE AS NEEDED
Status: DISCONTINUED | OUTPATIENT
Start: 2023-12-08 | End: 2023-12-10

## 2023-12-08 RX ORDER — CARBOPROST TROMETHAMINE 250 UG/ML
250 INJECTION, SOLUTION INTRAMUSCULAR ONCE AS NEEDED
Status: DISCONTINUED | OUTPATIENT
Start: 2023-12-08 | End: 2023-12-10

## 2023-12-08 RX ORDER — LIDOCAINE HYDROCHLORIDE 10 MG/ML
30 INJECTION INFILTRATION; PERINEURAL ONCE AS NEEDED
Status: DISCONTINUED | OUTPATIENT
Start: 2023-12-08 | End: 2023-12-10

## 2023-12-08 RX ORDER — NIFEDIPINE 10 MG/1
10 CAPSULE ORAL ONCE AS NEEDED
Status: DISCONTINUED | OUTPATIENT
Start: 2023-12-08 | End: 2023-12-10

## 2023-12-08 RX ORDER — TERBUTALINE SULFATE 1 MG/ML
0.25 INJECTION SUBCUTANEOUS ONCE AS NEEDED
Status: DISCONTINUED | OUTPATIENT
Start: 2023-12-08 | End: 2023-12-10

## 2023-12-08 RX ORDER — LOPERAMIDE HYDROCHLORIDE 2 MG/1
4 CAPSULE ORAL EVERY 2 HOUR PRN
Status: DISCONTINUED | OUTPATIENT
Start: 2023-12-08 | End: 2023-12-10

## 2023-12-08 RX ORDER — HYDRALAZINE HYDROCHLORIDE 20 MG/ML
5 INJECTION INTRAMUSCULAR; INTRAVENOUS ONCE AS NEEDED
Status: DISCONTINUED | OUTPATIENT
Start: 2023-12-08 | End: 2023-12-10

## 2023-12-08 RX ORDER — OXYTOCIN 10 [USP'U]/ML
10 INJECTION, SOLUTION INTRAMUSCULAR; INTRAVENOUS ONCE AS NEEDED
Status: DISCONTINUED | OUTPATIENT
Start: 2023-12-08 | End: 2023-12-10

## 2023-12-08 RX ORDER — SODIUM CHLORIDE, SODIUM LACTATE, POTASSIUM CHLORIDE, CALCIUM CHLORIDE 600; 310; 30; 20 MG/100ML; MG/100ML; MG/100ML; MG/100ML
125 INJECTION, SOLUTION INTRAVENOUS CONTINUOUS
Status: DISCONTINUED | OUTPATIENT
Start: 2023-12-08 | End: 2023-12-10

## 2023-12-08 RX ORDER — ONDANSETRON HYDROCHLORIDE 2 MG/ML
4 INJECTION, SOLUTION INTRAVENOUS EVERY 6 HOURS PRN
Status: DISCONTINUED | OUTPATIENT
Start: 2023-12-08 | End: 2023-12-10

## 2023-12-08 RX ORDER — METOCLOPRAMIDE HYDROCHLORIDE 5 MG/ML
10 INJECTION INTRAMUSCULAR; INTRAVENOUS EVERY 6 HOURS PRN
Status: DISCONTINUED | OUTPATIENT
Start: 2023-12-08 | End: 2023-12-10

## 2023-12-08 RX ADMIN — MISOPROSTOL 25 MCG: 100 TABLET ORAL at 21:52

## 2023-12-08 SDOH — SOCIAL STABILITY: SOCIAL INSECURITY: VERBAL ABUSE: DENIES

## 2023-12-08 SDOH — HEALTH STABILITY: MENTAL HEALTH: NON-SPECIFIC ACTIVE SUICIDAL THOUGHTS (PAST 1 MONTH): NO

## 2023-12-08 SDOH — HEALTH STABILITY: MENTAL HEALTH: SUICIDAL BEHAVIOR (LIFETIME): NO

## 2023-12-08 SDOH — ECONOMIC STABILITY: HOUSING INSECURITY: DO YOU FEEL UNSAFE GOING BACK TO THE PLACE WHERE YOU ARE LIVING?: NO

## 2023-12-08 SDOH — HEALTH STABILITY: MENTAL HEALTH: WISH TO BE DEAD (PAST 1 MONTH): NO

## 2023-12-08 SDOH — SOCIAL STABILITY: SOCIAL INSECURITY: DO YOU FEEL ANYONE HAS EXPLOITED OR TAKEN ADVANTAGE OF YOU FINANCIALLY OR OF YOUR PERSONAL PROPERTY?: NO

## 2023-12-08 SDOH — SOCIAL STABILITY: SOCIAL INSECURITY: ABUSE SCREEN: ADULT

## 2023-12-08 SDOH — SOCIAL STABILITY: SOCIAL INSECURITY: HAVE YOU HAD THOUGHTS OF HARMING ANYONE ELSE?: NO

## 2023-12-08 SDOH — HEALTH STABILITY: MENTAL HEALTH: WERE YOU ABLE TO COMPLETE ALL THE BEHAVIORAL HEALTH SCREENINGS?: YES

## 2023-12-08 SDOH — SOCIAL STABILITY: SOCIAL INSECURITY: DOES ANYONE TRY TO KEEP YOU FROM HAVING/CONTACTING OTHER FRIENDS OR DOING THINGS OUTSIDE YOUR HOME?: NO

## 2023-12-08 SDOH — SOCIAL STABILITY: SOCIAL INSECURITY: ARE YOU OR HAVE YOU BEEN THREATENED OR ABUSED PHYSICALLY, EMOTIONALLY, OR SEXUALLY BY ANYONE?: NO

## 2023-12-08 SDOH — SOCIAL STABILITY: SOCIAL INSECURITY: HAS ANYONE EVER THREATENED TO HURT YOUR FAMILY OR YOUR PETS?: NO

## 2023-12-08 SDOH — SOCIAL STABILITY: SOCIAL INSECURITY: PHYSICAL ABUSE: DENIES

## 2023-12-08 SDOH — SOCIAL STABILITY: SOCIAL INSECURITY: ARE THERE ANY APPARENT SIGNS OF INJURIES/BEHAVIORS THAT COULD BE RELATED TO ABUSE/NEGLECT?: NO

## 2023-12-08 ASSESSMENT — LIFESTYLE VARIABLES
AUDIT-C TOTAL SCORE: 0
SKIP TO QUESTIONS 9-10: 1
HOW MANY STANDARD DRINKS CONTAINING ALCOHOL DO YOU HAVE ON A TYPICAL DAY: PATIENT DOES NOT DRINK
HOW OFTEN DO YOU HAVE 6 OR MORE DRINKS ON ONE OCCASION: NEVER
HOW OFTEN DO YOU HAVE A DRINK CONTAINING ALCOHOL: NEVER
AUDIT-C TOTAL SCORE: 0

## 2023-12-08 ASSESSMENT — PAIN SCALES - GENERAL
PAINLEVEL_OUTOF10: 0 - NO PAIN

## 2023-12-08 ASSESSMENT — PATIENT HEALTH QUESTIONNAIRE - PHQ9
2. FEELING DOWN, DEPRESSED OR HOPELESS: NOT AT ALL
SUM OF ALL RESPONSES TO PHQ9 QUESTIONS 1 & 2: 0
1. LITTLE INTEREST OR PLEASURE IN DOING THINGS: NOT AT ALL

## 2023-12-08 ASSESSMENT — ACTIVITIES OF DAILY LIVING (ADL): LACK_OF_TRANSPORTATION: NO

## 2023-12-09 PROBLEM — E87.6 HYPOKALEMIA: Status: ACTIVE | Noted: 2023-12-09

## 2023-12-09 PROBLEM — O13.3 GESTATIONAL HYPERTENSION, THIRD TRIMESTER (HHS-HCC): Status: ACTIVE | Noted: 2023-12-09

## 2023-12-09 PROBLEM — O14.93 PRE-ECLAMPSIA IN THIRD TRIMESTER (HHS-HCC): Status: ACTIVE | Noted: 2023-12-09

## 2023-12-09 LAB
ABO GROUP (TYPE) IN BLOOD: NORMAL
ALBUMIN SERPL BCP-MCNC: 3.7 G/DL (ref 3.4–5)
ALP SERPL-CCNC: 182 U/L (ref 33–110)
ALT SERPL W P-5'-P-CCNC: 5 U/L (ref 7–45)
ANION GAP SERPL CALC-SCNC: 18 MMOL/L (ref 10–20)
AST SERPL W P-5'-P-CCNC: 11 U/L (ref 9–39)
BILIRUB SERPL-MCNC: 0.4 MG/DL (ref 0–1.2)
BUN SERPL-MCNC: 5 MG/DL (ref 6–23)
CALCIUM SERPL-MCNC: 9.4 MG/DL (ref 8.6–10.3)
CHLORIDE SERPL-SCNC: 102 MMOL/L (ref 98–107)
CO2 SERPL-SCNC: 20 MMOL/L (ref 21–32)
CREAT SERPL-MCNC: 0.47 MG/DL (ref 0.5–1.05)
CREAT UR-MCNC: 49.2 MG/DL (ref 20–320)
GFR SERPL CREATININE-BSD FRML MDRD: >90 ML/MIN/1.73M*2
GLUCOSE SERPL-MCNC: 81 MG/DL (ref 74–99)
HOLD SPECIMEN: NORMAL
LDH SERPL L TO P-CCNC: 167 U/L (ref 84–246)
MAGNESIUM SERPL-MCNC: 1.26 MG/DL (ref 1.6–2.4)
POTASSIUM SERPL-SCNC: 3.1 MMOL/L (ref 3.5–5.3)
PROT SERPL-MCNC: 6.3 G/DL (ref 6.4–8.2)
PROT UR-ACNC: 17 MG/DL (ref 5–24)
PROT/CREAT UR: 0.35 MG/MG CREAT (ref 0–0.17)
RH FACTOR (ANTIGEN D): NORMAL
SODIUM SERPL-SCNC: 137 MMOL/L (ref 136–145)
T PALLIDUM AB SER QL: NONREACTIVE
URATE SERPL-MCNC: 6 MG/DL (ref 2.3–6.7)

## 2023-12-09 PROCEDURE — 10H07YZ INSERTION OF OTHER DEVICE INTO PRODUCTS OF CONCEPTION, VIA NATURAL OR ARTIFICIAL OPENING: ICD-10-PCS | Performed by: STUDENT IN AN ORGANIZED HEALTH CARE EDUCATION/TRAINING PROGRAM

## 2023-12-09 PROCEDURE — 2500000001 HC RX 250 WO HCPCS SELF ADMINISTERED DRUGS (ALT 637 FOR MEDICARE OP): Performed by: STUDENT IN AN ORGANIZED HEALTH CARE EDUCATION/TRAINING PROGRAM

## 2023-12-09 PROCEDURE — 1120000001 HC OB PRIVATE ROOM DAILY

## 2023-12-09 PROCEDURE — 7210000002 HC LABOR PER HOUR

## 2023-12-09 PROCEDURE — 84156 ASSAY OF PROTEIN URINE: CPT | Performed by: STUDENT IN AN ORGANIZED HEALTH CARE EDUCATION/TRAINING PROGRAM

## 2023-12-09 PROCEDURE — 2500000004 HC RX 250 GENERAL PHARMACY W/ HCPCS (ALT 636 FOR OP/ED): Performed by: NURSE ANESTHETIST, CERTIFIED REGISTERED

## 2023-12-09 PROCEDURE — 2500000005 HC RX 250 GENERAL PHARMACY W/O HCPCS: Performed by: STUDENT IN AN ORGANIZED HEALTH CARE EDUCATION/TRAINING PROGRAM

## 2023-12-09 PROCEDURE — 51702 INSERT TEMP BLADDER CATH: CPT

## 2023-12-09 PROCEDURE — 2500000004 HC RX 250 GENERAL PHARMACY W/ HCPCS (ALT 636 FOR OP/ED): Performed by: STUDENT IN AN ORGANIZED HEALTH CARE EDUCATION/TRAINING PROGRAM

## 2023-12-09 PROCEDURE — 01967 NEURAXL LBR ANES VAG DLVR: CPT | Performed by: NURSE ANESTHETIST, CERTIFIED REGISTERED

## 2023-12-09 PROCEDURE — 82570 ASSAY OF URINE CREATININE: CPT | Performed by: STUDENT IN AN ORGANIZED HEALTH CARE EDUCATION/TRAINING PROGRAM

## 2023-12-09 PROCEDURE — 01968 ANES/ANALG CS DLVR NEURAXIAL: CPT | Performed by: NURSE ANESTHETIST, CERTIFIED REGISTERED

## 2023-12-09 PROCEDURE — 10907ZC DRAINAGE OF AMNIOTIC FLUID, THERAPEUTIC FROM PRODUCTS OF CONCEPTION, VIA NATURAL OR ARTIFICIAL OPENING: ICD-10-PCS | Performed by: STUDENT IN AN ORGANIZED HEALTH CARE EDUCATION/TRAINING PROGRAM

## 2023-12-09 RX ORDER — MAGNESIUM SULFATE HEPTAHYDRATE 40 MG/ML
2 INJECTION, SOLUTION INTRAVENOUS ONCE
Status: COMPLETED | OUTPATIENT
Start: 2023-12-09 | End: 2023-12-09

## 2023-12-09 RX ORDER — POTASSIUM CHLORIDE 1.5 G/1.58G
20 POWDER, FOR SOLUTION ORAL 2 TIMES DAILY
Status: DISCONTINUED | OUTPATIENT
Start: 2023-12-09 | End: 2023-12-10

## 2023-12-09 RX ORDER — FENTANYL/ROPIVACAINE/NS/PF 2MCG/ML-.2
0-25 PLASTIC BAG, INJECTION (ML) INJECTION CONTINUOUS
Status: DISCONTINUED | OUTPATIENT
Start: 2023-12-09 | End: 2023-12-10

## 2023-12-09 RX ORDER — FENTANYL/ROPIVACAINE/NS/PF 2MCG/ML-.2
PLASTIC BAG, INJECTION (ML) INJECTION
Status: COMPLETED
Start: 2023-12-09 | End: 2023-12-09

## 2023-12-09 RX ORDER — LIDOCAINE HYDROCHLORIDE 20 MG/ML
INJECTION, SOLUTION EPIDURAL; INFILTRATION; INTRACAUDAL; PERINEURAL AS NEEDED
Status: DISCONTINUED | OUTPATIENT
Start: 2023-12-09 | End: 2023-12-10

## 2023-12-09 RX ORDER — OXYTOCIN/0.9 % SODIUM CHLORIDE 30/500 ML
2-30 PLASTIC BAG, INJECTION (ML) INTRAVENOUS CONTINUOUS
Status: DISCONTINUED | OUTPATIENT
Start: 2023-12-09 | End: 2023-12-10

## 2023-12-09 RX ADMIN — Medication 8 ML/HR: at 05:56

## 2023-12-09 RX ADMIN — Medication 10 ML/HR: at 13:34

## 2023-12-09 RX ADMIN — LIDOCAINE HYDROCHLORIDE 100 MG: 20 INJECTION, SOLUTION EPIDURAL; INFILTRATION; INTRACAUDAL; PERINEURAL at 10:17

## 2023-12-09 RX ADMIN — Medication 10 ML/HR: at 21:11

## 2023-12-09 RX ADMIN — MISOPROSTOL 25 MCG: 100 TABLET ORAL at 00:55

## 2023-12-09 RX ADMIN — Medication 2 MILLI-UNITS/MIN: at 06:30

## 2023-12-09 RX ADMIN — SODIUM CHLORIDE, POTASSIUM CHLORIDE, SODIUM LACTATE AND CALCIUM CHLORIDE 500 ML: 600; 310; 30; 20 INJECTION, SOLUTION INTRAVENOUS at 05:31

## 2023-12-09 RX ADMIN — ONDANSETRON HYDROCHLORIDE 4 MG: 4 TABLET, FILM COATED ORAL at 06:20

## 2023-12-09 RX ADMIN — MAGNESIUM SULFATE HEPTAHYDRATE 2 G: 40 INJECTION, SOLUTION INTRAVENOUS at 11:47

## 2023-12-09 RX ADMIN — POTASSIUM CHLORIDE 20 MEQ: 1.5 POWDER, FOR SOLUTION ORAL at 11:46

## 2023-12-09 ASSESSMENT — PAIN SCALES - GENERAL
PAINLEVEL_OUTOF10: 0 - NO PAIN
PAINLEVEL_OUTOF10: 4
PAINLEVEL_OUTOF10: 5 - MODERATE PAIN
PAINLEVEL_OUTOF10: 0 - NO PAIN
PAINLEVEL_OUTOF10: 8
PAINLEVEL_OUTOF10: 2
PAINLEVEL_OUTOF10: 7
PAINLEVEL_OUTOF10: 0 - NO PAIN
PAINLEVEL_OUTOF10: 0 - NO PAIN
PAINLEVEL_OUTOF10: 2
PAINLEVEL_OUTOF10: 0 - NO PAIN
PAINLEVEL_OUTOF10: 3
PAINLEVEL_OUTOF10: 0 - NO PAIN
PAINLEVEL_OUTOF10: 8
PAINLEVEL_OUTOF10: 3
PAINLEVEL_OUTOF10: 0 - NO PAIN

## 2023-12-09 NOTE — NURSING NOTE
Dr Lee notified of current ctx pattern. RN to give a 500mL bolus and if in a half hour turn down pitocin if no decrease in ctxs.

## 2023-12-09 NOTE — PROGRESS NOTES
Intrapartum Progress Note    Assessment/Plan   Dary Costa is a 23 y.o.  at 39w3d. CHARLIE: 2023, by Ultrasound.     Continue iol with oxytocin, comfortable with epidural   Cat 1 FHT   Asx preE without severe features, currently mild range, continue to monitor   Hypokalemia - oral supp    Principal Problem:    39 weeks gestation of pregnancy  Active Problems:    Pre-eclampsia in third trimester    Hypokalemia        Subjective   Much more comfortable with epidural, can still move legs but feel tingling  -vb/lof  Denies ha/vision changes     Objective   Last Vitals:  Temp Pulse Resp BP MAP Pulse Ox   36.7 °C (98.1 °F) 100 18 (!) 137/90   96 %     Vitals Min/Max Last 24 Hours:  Temp  Min: 36.5 °C (97.7 °F)  Max: 36.8 °C (98.2 °F)  Pulse  Min: 72  Max: 117  Resp  Min: 16  Max: 18  BP  Min: 107/58  Max: 141/88    Intake/Output:  No intake or output data in the 24 hours ending 23 0730    Physical Examination:  FHR is  baseline 120, moderate variability, with Accelerations, and a Category I tracing.    [place] reading:  irregular q1-6m  Oxy @ 4  CERVIX: 2 cm dilated, 80 % effaced, -2 station; MEMBRANES are Intact

## 2023-12-09 NOTE — PROGRESS NOTES
[facility]    HPI:  10/3/20,   Time: 3:27 PM EDT         Betsy Isaac is a 3 wk.o. female presenting to the ED for discharge from the right eye, beginning yesterday ago. The complaint has been persistent, mild in severity, and worsened by nothing. Carried to the department by the mother who provides information stating she noticed yesterday the child had drainage from the right eye. States she cleaned the drainage from the right eye and noticed a small amount of bleeding.  was delivered at 38 weeks gestation no complications has been eating and drinking normally. There is mild drainage noted from the right eye. ROS:   Pertinent positives and negatives are stated within HPI, all other systems reviewed and are negative.  --------------------------------------------- PAST HISTORY ---------------------------------------------  Past Medical History:  has no past medical history on file. Past Surgical History:  has no past surgical history on file. Social History:  reports that she has never smoked. She has never used smokeless tobacco. She reports that she does not drink alcohol or use drugs. Family History: family history is not on file. The patients home medications have been reviewed. Allergies: Patient has no known allergies. -------------------------------------------------- RESULTS -------------------------------------------------  All laboratory and radiology results have been personally reviewed by myself   LABS:  No results found for this visit on 10/03/20. RADIOLOGY:  Interpreted by Radiologist.  No orders to display       ------------------------- NURSING NOTES AND VITALS REVIEWED ---------------------------   The nursing notes within the ED encounter and vital signs as below have been reviewed.    Pulse 151   Temp 99.1 °F (37.3 °C) (Axillary)   Resp 40   Wt 7 lb 12 oz (3.515 kg)   SpO2 98%   Oxygen Saturation Interpretation: Intrapartum Progress Note    Assessment/Plan   Dary Costa is a 23 y.o.  at 39w3d. CHARLIE: 2023, by Ultrasound.     IOL  -Latent labor  -AROM'd on pit since 900  -Pit @ 10, continue to uptitrate per protocol  -CEFM, cat 1 currently  -Epidural infusing  -GBS negative  -Admission hgb 11.3, T&S active    PEC w/o severe features  -Diagnosed by mild range BPs and P:C 0.35  -Asymptomatic  -HELLP labs negative    Hypokalemia  -Potassium 3.1 on admission  -Repleted orally  -Serum Mg level added on  -To repeat BMP in AM    Azul Lee MD       Principal Problem:    39 weeks gestation of pregnancy  Active Problems:    Pre-eclampsia in third trimester    Hypokalemia    Pregnancy Problems (from 22 to present)       Problem Noted Resolved    Pre-eclampsia in third trimester 2023 by Lubna Zabala MD No    Priority:  Medium      39 weeks gestation of pregnancy 2023 by Lubna Zabala MD No    Priority:  Medium      Encounter for supervision of normal first pregnancy in third trimester 10/24/2023 by ALEJA Murphy No    Priority:  Medium      Overview Signed 10/24/2023  6:08 PM by ALEJA Murphy     Covid vaccinated, declines booster  Declines Flu  rrNIPS                   Subjective   Comfortable with epidural, not feeling ctx. Denies HA, scotoma, CP, SOB, or RUQ pain    Objective   Last Vitals:  Temp Pulse Resp BP MAP Pulse Ox   36.6 °C (97.9 °F) 88 16 (!) 134/99   99 %     Vitals Min/Max Last 24 Hours:  Temp  Min: 36.5 °C (97.7 °F)  Max: 36.8 °C (98.2 °F)  Pulse  Min: 72  Max: 117  Resp  Min: 16  Max: 18  BP  Min: 107/58  Max: 141/88    Intake/Output:  No intake or output data in the 24 hours ending 23 0912    Physical Examination:  Gen: awake, alert  Head: NCAT  HEENT: moist mucus membranes  Pulm: breathing comfortably on room air  CV: warm and well-perfused  Abd: gravid  Neuro: alert and oriented  Psych: appropriate affect     FHT: 135, moderate  Normal      ---------------------------------------------------PHYSICAL EXAM--------------------------------------      Constitutional/General: Alert with age appropriate activity,  well appearing, non toxic in NAD  Head: NC/AT, fontanelles normal not sunken or bulging  Eyes: PERRL, EOMI, small amount of drainage noted from the right eye, has a superficial abrasion at the right upper eyelid. Mother states that she has been cleaning the right eye with a warm cloth to remove the drainage. Mouth: Oropharynx clear, handling secretions, no trismus  Neck: Supple, full ROM, no meningeal signs  Pulmonary: Lungs clear to auscultation bilaterally, no wheezes, rales, or rhonchi. Not in respiratory distress  Cardiovascular:  Regular rate and rhythm, no murmurs, gallops, or rubs. 2+ distal pulses  Abdomen: Soft, non tender, non distended,   Extremities: Moves all extremities x 4. Warm and well perfused  Skin: warm and dry without rash  Neurologic: Age-appropriate activity  Psych: Normal Affect      ------------------------------ ED COURSE/MEDICAL DECISION MAKING----------------------  Medications - No data to display      Medical Decision Making: Mother advised to use the erythromycin ointment daily and follow-up with pediatrician. Counseling: The emergency provider has spoken with the family member mother and discussed todays results, in addition to providing specific details for the plan of care and counseling regarding the diagnosis and prognosis. Questions are answered at this time and they are agreeable with the plan.      --------------------------------- IMPRESSION AND DISPOSITION ---------------------------------    IMPRESSION  1.  Conjunctivitis of right eye, unspecified conjunctivitis type        DISPOSITION  Disposition: Discharge to home  Patient condition is good                 Rina Miller, SOCORRO - RONY  10/03/20 6120 variability, + accels, -decels  TOCO: ctx q2 min  SVE: 3.5/80/-2, AROM for clear    Lab Review:  Labs in chart were reviewed.

## 2023-12-09 NOTE — CARE PLAN
Problem: Vaginal Birth or  Section  Goal: Fetal and maternal status remain reassuring during the birth process  Outcome: Progressing  Flowsheets (Taken 2023)  Fetal and maternal status remain reassuring during the birth process:   Monitor vital signs   Monitor fetal heart rate   Monitor labor progression (Vaginal delivery)   Monitor uterine activity   Med administration/monitoring of effect  Goal: Minimal s/sx of HDP and BP<160/110  Outcome: Progressing     Problem: Safety - Adult  Goal: Free from fall injury  2023 by Sherin Ring RN  Outcome: Progressing  Flowsheets (Taken 2023)  Free from fall injury:   Instruct family/caregiver on patient safety   Based on caregiver fall risk screen, instruct family/caregiver to ask for assistance with transferring infant if caregiver noted to have fall risk factors  2023 by Sherin Ring RN  Outcome: Progressing     Problem: Hypertensive Disorder of Pregnancy (HDP)  Goal: Minimal s/sx of HDP and BP<160/110  2023 by Sherin Ring RN  Outcome: Progressing  Flowsheets (Taken 2023)  Minimal s/sx of HDP and BP <160/110:   Med administration/monitoring of effect   Monitor for s/sx of worsening HDP  2023 by Sherin Rign RN  Outcome: Progressing     Problem: Nausea/Vomiting  Goal: Free from nausea/vomiting  2023 by Sherin Ring RN  Outcome: Progressing  Flowsheets (Taken 2023)  Free from nausea/vomiting:   Med administration/monitoring of effect   Non-pharmacologic nausea interventions  2023 by Sherin Ring RN  Outcome: Progressing  Goal: Achieve/maintain normal electrolyte level  2023 by Sherin Ring RN  Outcome: Progressing  Flowsheets (Taken 2023)  Achieve/maintain normal electrolyte level:   IV fluids as ordered   Monitor VS, labs, daily weight  2023 by Sherin Ring RN  Outcome: Progressing     Problem: Pain - Adult  Goal:  Verbalizes/displays adequate comfort level or baseline comfort level  12/9/2023 0649 by Sherin Ring RN  Outcome: Progressing  Flowsheets (Taken 12/9/2023 0649)  Verbalizes/displays adequate comfort level or baseline comfort level:   Encourage patient to monitor pain and request assistance   Assess pain using appropriate pain scale   Administer analgesics based on type and severity of pain and evaluate response   Implement non-pharmacological measures as appropriate and evaluate response   Consider cultural and social influences on pain and pain management   Notify Licensed Independent Practitioner if interventions unsuccessful or patient reports new pain  12/9/2023 0647 by Sherin Ring RN  Outcome: Progressing     Problem: Discharge Planning  Goal: Discharge to home or other facility with appropriate resources  12/9/2023 0649 by Sherin Ring RN  Outcome: Progressing  Flowsheets (Taken 12/9/2023 0649)  Discharge to home or other facility with appropriate resources:   Identify barriers to discharge with patient and caregiver   Identify discharge learning needs (meds, wound care, etc)  12/9/2023 0647 by Sherin Ring RN  Outcome: Progressing

## 2023-12-09 NOTE — PROGRESS NOTES
Feeling more pressure  with ctx. Category 1 tracing, ctx q2-3 min spontaneously. SVE 7/100/0 with new cervical swelling, feels STEFANIE. Reviewed etiologies of protracted active phase including inadequate ctx and CPD. IUPC placed. To restart pit. Plan to recheck in 2 hr or sooner as clinically indicated.    Azul Lee MD

## 2023-12-09 NOTE — PROGRESS NOTES
Intrapartum Progress Note    Assessment/Plan   Dary Costa is a 23 y.o.  at 39w2d. CHARLIE: 2023, by Ultrasound.     25v cytotec placed, repeat q3hr prn     Active Problems:  There are no active Hospital Problems.    Pregnancy Problems (from 23 to present)       Problem Noted Resolved    Encounter for supervision of normal first pregnancy in third trimester 10/24/2023 by ALEJA Murphy No    Priority:  Medium      Overview Signed 10/24/2023  6:08 PM by ALEJA Murphy     Covid vaccinated, declines booster  Declines Flu  rrNIPS                   Subjective   No complaints, starting induction     Objective   Last Vitals:  Temp Pulse Resp BP MAP Pulse Ox   36.7 °C (98 °F) (!) 117 16 (!) 137/97   (!) 95 %     Vitals Min/Max Last 24 Hours:  Temp  Min: 36.7 °C (98 °F)  Max: 36.7 °C (98 °F)  Pulse  Min: 117  Max: 117  Resp  Min: 16  Max: 16  BP  Min: 137/97  Max: 137/97

## 2023-12-09 NOTE — PROGRESS NOTES
Intrapartum Progress Note    Assessment/Plan   Dary Costa is a 23 y.o.  at 39w3d. CHARLIE: 2023, by Ultrasound.     Continue induction with oxytocin @ 0500, defer next cytotec placement, anticipate , coping well, epidural prn   Cat 1 tracing  2nd elevated BP in mild range now c/w gHTN, labs pending for r/o preE, remains asx     Principal Problem:    39 weeks gestation of pregnancy  Active Problems:    Gestational hypertension, third trimester    Pregnancy Problems (from 23 to present)       Problem Noted Resolved    39 weeks gestation of pregnancy 2023 by Lubna Zabala MD No    Priority:  Medium      Encounter for supervision of normal first pregnancy in third trimester 10/24/2023 by ALEJA Murphy No    Priority:  Medium      Overview Signed 10/24/2023  6:08 PM by ALEJA Murphy     Covid vaccinated, declines booster  Declines Flu  rrNIPS                   Subjective   Feeling more regular contractions, not ready for anything for pain yet.     Objective   Last Vitals:  Temp Pulse Resp BP MAP Pulse Ox   36.8 °C (98.2 °F) 85 18 (!) 141/88   98 %     Vitals Min/Max Last 24 Hours:  Temp  Min: 36.7 °C (98 °F)  Max: 36.8 °C (98.2 °F)  Pulse  Min: 85  Max: 117  Resp  Min: 16  Max: 18  BP  Min: 133/81  Max: 141/88    Intake/Output:  No intake or output data in the 24 hours ending 23 0413    Physical Examination:  Appears comfortable, relaxed   FHR is  baseline 135, moderate variability, with Accelerations, no decels, and a Category I tracing.    [place] reading:  q2m  SVE 2/80/-2    Lab Review:  Lab Results   Component Value Date    ABO O 2023    LABRH POS 2023    ABSCRN NEG 2023     Lab Results   Component Value Date    WBC 14.0 (H) 2023    HGB 11.3 (L) 2023    HCT 34.5 (L) 2023     2023

## 2023-12-09 NOTE — ANESTHESIA PREPROCEDURE EVALUATION
Patient: Dary Costa    Evaluation Method: In-person visit    Procedure Information    Date: 12/08/23  Procedure: Labor Analgesia         Relevant Problems   Cardiovascular   (+) Pre-eclampsia in third trimester      /Renal  Frequent heartburn with pregnancy      Neuro/Psych   (+) Depression   (+) Generalized anxiety disorder with panic attacks       Clinical information reviewed:   Tobacco  Allergies  Meds  Problems  Med Hx  Surg Hx            NPO Detail:  No data recorded     OB/Gyn Evaluation    Present Pregnancy    Patient is pregnant now.   Obstetric History                Physical Exam    Airway  Mallampati: II     Cardiovascular - normal exam     Dental   Comments: Permanent retainer upper teeth   Pulmonary - normal exam     Abdominal            Anesthesia Plan    ASA 2     epidural   (I informed and discussed the risks and benefits of general, spinal and epidural anesthesia with the patient.  The patient expressed her understanding and her questions were answered.  A verbal consent was given by the patient.  Patient has no history of problems with anesthesia  Patient has no family history of problems with anesthesia)

## 2023-12-09 NOTE — PROGRESS NOTES
Intrapartum Progress Note    Assessment/Plan   Dary Costa is a 23 y.o.  at 39w3d. CHARLIE: 2023, by Ultrasound.     Continue iol cervical ripening with cytotec  Cat 1 FHT     Principal Problem:    39 weeks gestation of pregnancy      Subjective   Feeling crampy contractions     Objective   Last Vitals:  Temp Pulse Resp BP MAP Pulse Ox   36.8 °C (98.2 °F) 88 18 133/81   98 %     Vitals Min/Max Last 24 Hours:  Temp  Min: 36.7 °C (98 °F)  Max: 36.8 °C (98.2 °F)  Pulse  Min: 88  Max: 117  Resp  Min: 16  Max: 18  BP  Min: 133/81  Max: 137/97    Intake/Output:  No intake or output data in the 24 hours ending 23 0058    Physical Examination:  FHR is baseline 130, moderate variability , with Accelerations, and a Category I tracing.    [place] reading:  irregular, no tachysystole   SVE 2/80/-2

## 2023-12-09 NOTE — CARE PLAN
The patient's goals for the shift include to have a healthy baby    The clinical goals for the shift include FHR remains reassuring during IOL    Over the shift, the patient made progress toward the following goals.

## 2023-12-09 NOTE — PROGRESS NOTES
Comfortable with epidural, feeling intermittent pressure with ctx.     FHT: 130, moderate variability, + accels, -decels  TOCO: ctx q2 min  SVE: 7/100/0    Active labor  -Pit d/c'ed several hours ago for tachysystole, concetta regularly spontaneously and making excellent cervical change, continue to hold for now  -CEFM, cat 1 currently  -Epidural infusing  -Recheck in 2hr or sooner as clinically indicated    PEC w/o SF  -Normotensive to mild range  -Asymptomatic    Electrolyte Derangements  -K 3.1 -> PO repletion  -Mg 1.2, for 2g IV with plan to recheck in AM    Azul Lee MD

## 2023-12-09 NOTE — PROGRESS NOTES
Feeling pressure with urge to push with ctx. Cat 1 tracing, ctx q2 min. SVE 4/100/-1. Suspect transitioning to active labor. To recheck in 4hr or sooner as indicated    Azul Lee MD

## 2023-12-09 NOTE — PROGRESS NOTES
Comfortable with epidural, feeling some pressure    FHT: 140, moderate variability, + accels, -decels  TOCO: ctx q2-3 min, inadequate MVUs  SVE: IUPC  found to be expelled on exam, 7/100/0, IUPC replaced    Unchanged after 4hr of inadequate ctx. Pit at 10, continue to uptitrate aggressively q30 minutes. Discussed concern for protracted active phase and recommendation for  delivery if unchanged on next exam. Patient agreeable.     Azul Lee MD

## 2023-12-09 NOTE — PROGRESS NOTES
Continued urge to push. Repeat SVE unchanged at 4/100/-1. Anesthesia to assess epidural    Azul Lee MD

## 2023-12-09 NOTE — H&P
Obstetrical Admission History and Physical     Dary Costa is a 23 y.o.  at 39w2d. CHARLIE: 2023, by Ultrasound. Estimated fetal weight: 8#10oz by 36.1wga US. She has had prenatal care with Dr Ron .    Chief Complaint: Scheduled Induction    Assessment/Plan    Proceed with scheduled eiol, cervical ripening with vaginal cytotec   Epidural PRN   Monitor BP, does not meet criteria now with 1x mrBP on arrival, labs sent with IV placement     Active Problems:  There are no active Hospital Problems.      Pregnancy Problems (from 22 to present)       Problem Noted Resolved    Encounter for supervision of normal first pregnancy in third trimester 10/24/2023 by ALEJA Murphy No    Priority:  Medium      Overview Signed 10/24/2023  6:08 PM by ALEJA Murphy     Covid vaccinated, declines booster  Declines Flu  rrNIPS                 Options for delivery have been discussed with the patient and she elects for an induction of labor.  Cervical ripening with cytotec, cervidil, other prostaglandin agents has been discussed.  Induction of labor with pitocin, amniotomy, cytotec, and cervical balloon have been discussed in detail. The risks, benefits, complications, alternatives, expected outcomes, potential problems during recuperation and recovery, and the risks of not performing the procedure were discussed with the patient. The patient stated understanding that the risks of delivery include, but are not limited to: death; reaction to medications; injury to bowel, bladder, ureters, uterus, cervix, vagina, and other pelvic and abdominal structures, infection; blood loss and possible need for transfusion; and potential need for surgery, including hysterectomy. The risks of injury to the infant during delivery were also discussed. All questions were answered. There was concurrence with the planned procedure, and the patient wanted to proceed.    Admit to inpatient status. I anticipate  that this patient will require a stay exceeding at least 2 midnights for delivery and postpartum.  Induction of labor.  Management of pregnancy complications, as indicated.    Subjective   Good fetal movement. Denies vaginal bleeding., C/O of occasional contractions., Denies leaking of fluid.   Denies ha, vision changes or paresthesias.     Reason for Induction of Labor:  Pregnancy at 39 weeks or greater for induction    1x elevated BP on arrival, will monitor, does not meet dx criteria for PIH yet      Obstetrical History   OB History    Para Term  AB Living   1             SAB IAB Ectopic Multiple Live Births                  # Outcome Date GA Lbr Surinder/2nd Weight Sex Delivery Anes PTL Lv   1 Current                Past Medical History  Past Medical History:   Diagnosis Date    Allergic rhinitis 2023    H/O left wrist surgery     Impacted cerumen of right ear 2023    Irritable bowel syndrome (IBS) 2023    Sore throat 2023    TIA (transient ischemic attack)     [place] teeth extracted         Past Surgical History   Past Surgical History:   Procedure Laterality Date    MR HEAD ANGIO WO IV CONTRAST  2021    MR HEAD ANGIO WO IV CONTRAST 2021 [facility]    MR NECK ANGIO WO IV CONTRAST  2021    MR NECK ANGIO WO IV CONTRAST 2021 [facility]    OTHER SURGICAL HISTORY  2020    Wrist surgery       Social History  Social History     Tobacco Use    Smoking status: Never    Smokeless tobacco: Never   Substance Use Topics    Alcohol use: Not Currently     Substance and Sexual Activity   Drug Use Never       Allergies  Patient has no known allergies.     Medications  Medications Prior to Admission   Medication Sig Dispense Refill Last Dose    acetaminophen (Tylenol) 500 mg tablet Take 1 tablet (500 mg) by mouth every 6 hours if needed for mild pain (1 - 3).   Past Week    ferrous sulfate 325 (65 Fe) MG EC tablet Take 65 mg by mouth 3 times a day  with meals. Do not crush, chew, or split.   Past Week    ondansetron (Zofran) 4 mg tablet Take 1 tablet (4 mg) by mouth every 8 hours if needed for nausea or vomiting. 20 tablet 2        Objective    Last Vitals  Temp Pulse Resp BP MAP O2 Sat   36.7 °C (98 °F) (!) 117 16 (!) 137/97   (!) 95 %     Physical Examination  GENERAL: Examination reveals a well developed, well nourished, gravid female in no acute distress. She is alert and cooperative.  HEENT: PERRLA.  NECK: supple, no significant adenopathy  LUNGS:  comfortable on room air   HEART:  normal rate, mild hypertension, no JVD   ABDOMEN: soft, gravid, nontender, nondistended, no abnormal masses, no epigastric pain  FHR is cat 1 , with moderate variabilityAccelerationsnormal baseline 130, + accels - decels    [place] reading:  rare, no tachysystole  The fetus is in a vertex presentation, determined by vaginal exam  VAGINA: normal appearing vagina with normal color and discharge and no lesions noted  CERVIX: 2 cm dilated, 80 % effaced, -3 station and Kidd's score of  , with cervix 2 cm dilated, 80 % effaced, -3 station,   in consistency,   in position.; MEMBRANES are Intact  EXTREMITIES: no redness or tenderness in the calves or thighs, no edema  SKIN: normal coloration and turgor, no rashes  NEUROLOGICAL: alert, oriented, normal speech, no focal findings or movement disorder noted  PSYCHOLOGICAL: awake and alert; oriented to person, place, and time    Lab Review  Labs in chart were reviewed.

## 2023-12-09 NOTE — ANESTHESIA PROCEDURE NOTES
Epidural Block    Patient location during procedure: OB  Start time: 12/9/2023 5:41 AM  End time: 12/9/2023 6:00 AM  Reason for block: labor analgesia  Staffing  Performed: CRNA   Authorized by: KRYSTINA West    Performed by: KRYSTINA West    Preanesthetic Checklist  Completed: patient identified, IV checked, risks and benefits discussed, surgical consent, pre-op evaluation, timeout performed and sterile techniques followed  Block Timeout  RN/Licensed healthcare professional reads aloud to the Anesthesia provider and entire team: Patient identity, procedure with side and site, patient position, and as applicable the availability of implants/special equipment/special requirements.  Patient on coagulant treatment: no  Timeout performed at: 12/9/2023 5:41 AM  Block Placement  Patient position: sitting  Prep: ChloraPrep  Sterility prep: mask, hand, gloves and drape  Sedation level: no sedation  Patient monitoring: heart rate, continuous pulse oximetry and blood pressure  Approach: midline  Local numbing: lidocaine 1% to skin and subcutaneous tissues  Vertebral space: lumbar  Epidural  Loss of resistance technique: saline  Guidance: landmark technique        Needle  Needle type: Tuohy   Needle gauge: 17  Needle length: 8.9cm  Needle insertion depth: 5 cm  Catheter type: multi-orifice  Catheter size: 20 G  Catheter at skin depth: 11 cm  Catheter securement method: clear occlusive dressing and surgical tape    Test dose: lidocaine 1.5% with epinephrine 1-to-200,000  Test dose given at 12/9/2023 5:54 AM  Test dose: lidocaine 1.5% with epinephrine 1-to-200,000  Test dose result: no positive test dose    PCEA  Medication concentration used: 0.2% Ropivacaine with 2 mcg/mL Fentanyl  Dose (mL): 5  Lockout (minutes): 20  1-Hour Limit (boluses/hr): 3  Basal Rate: 8        Assessment  Sensory level: T6 bilateral  Block outcome: pain improved  Number of attempts: 1  Events: no positive test  dose  Procedure assessment: patient tolerated procedure well with no immediate complications

## 2023-12-10 LAB
ALBUMIN SERPL BCP-MCNC: 3.8 G/DL (ref 3.4–5)
ALP SERPL-CCNC: 168 U/L (ref 33–110)
ALT SERPL W P-5'-P-CCNC: 6 U/L (ref 7–45)
ANION GAP SERPL CALC-SCNC: 16 MMOL/L (ref 10–20)
ANION GAP SERPL CALC-SCNC: 20 MMOL/L (ref 10–20)
APTT PPP: 26 SECONDS (ref 27–38)
AST SERPL W P-5'-P-CCNC: 18 U/L (ref 9–39)
BILIRUB SERPL-MCNC: 0.6 MG/DL (ref 0–1.2)
BUN SERPL-MCNC: 6 MG/DL (ref 6–23)
BUN SERPL-MCNC: 6 MG/DL (ref 6–23)
CALCIUM SERPL-MCNC: 8.3 MG/DL (ref 8.6–10.3)
CALCIUM SERPL-MCNC: 8.7 MG/DL (ref 8.6–10.3)
CHLORIDE SERPL-SCNC: 104 MMOL/L (ref 98–107)
CHLORIDE SERPL-SCNC: 104 MMOL/L (ref 98–107)
CO2 SERPL-SCNC: 16 MMOL/L (ref 21–32)
CO2 SERPL-SCNC: 18 MMOL/L (ref 21–32)
CREAT SERPL-MCNC: 0.89 MG/DL (ref 0.5–1.05)
CREAT SERPL-MCNC: 0.99 MG/DL (ref 0.5–1.05)
ERYTHROCYTE [DISTWIDTH] IN BLOOD BY AUTOMATED COUNT: 13.5 % (ref 11.5–14.5)
FIBRINOGEN PPP-MCNC: 601 MG/DL (ref 200–400)
GFR SERPL CREATININE-BSD FRML MDRD: 82 ML/MIN/1.73M*2
GFR SERPL CREATININE-BSD FRML MDRD: >90 ML/MIN/1.73M*2
GLUCOSE SERPL-MCNC: 80 MG/DL (ref 74–99)
GLUCOSE SERPL-MCNC: 85 MG/DL (ref 74–99)
HCT VFR BLD AUTO: 35.4 % (ref 36–46)
HGB BLD-MCNC: 11.3 G/DL (ref 12–16)
HOLD SPECIMEN: NORMAL
HOLD SPECIMEN: NORMAL
INR PPP: 0.9 (ref 0.9–1.1)
MAGNESIUM SERPL-MCNC: 1.59 MG/DL (ref 1.6–2.4)
MCH RBC QN AUTO: 29 PG (ref 26–34)
MCHC RBC AUTO-ENTMCNC: 31.9 G/DL (ref 32–36)
MCV RBC AUTO: 91 FL (ref 80–100)
NRBC BLD-RTO: 0 /100 WBCS (ref 0–0)
PLATELET # BLD AUTO: 193 X10*3/UL (ref 150–450)
POTASSIUM SERPL-SCNC: 3 MMOL/L (ref 3.5–5.3)
POTASSIUM SERPL-SCNC: 3.7 MMOL/L (ref 3.5–5.3)
PROT SERPL-MCNC: 6.7 G/DL (ref 6.4–8.2)
PROTHROMBIN TIME: 10.4 SECONDS (ref 9.8–12.8)
RBC # BLD AUTO: 3.9 X10*6/UL (ref 4–5.2)
SODIUM SERPL-SCNC: 135 MMOL/L (ref 136–145)
SODIUM SERPL-SCNC: 136 MMOL/L (ref 136–145)
WBC # BLD AUTO: 24.2 X10*3/UL (ref 4.4–11.3)

## 2023-12-10 PROCEDURE — 0W3R0ZZ CONTROL BLEEDING IN GENITOURINARY TRACT, OPEN APPROACH: ICD-10-PCS | Performed by: STUDENT IN AN ORGANIZED HEALTH CARE EDUCATION/TRAINING PROGRAM

## 2023-12-10 PROCEDURE — 2500000004 HC RX 250 GENERAL PHARMACY W/ HCPCS (ALT 636 FOR OP/ED): Performed by: STUDENT IN AN ORGANIZED HEALTH CARE EDUCATION/TRAINING PROGRAM

## 2023-12-10 PROCEDURE — 96372 THER/PROPH/DIAG INJ SC/IM: CPT | Performed by: STUDENT IN AN ORGANIZED HEALTH CARE EDUCATION/TRAINING PROGRAM

## 2023-12-10 PROCEDURE — 36430 TRANSFUSION BLD/BLD COMPNT: CPT

## 2023-12-10 PROCEDURE — P9045 ALBUMIN (HUMAN), 5%, 250 ML: HCPCS | Mod: JZ | Performed by: NURSE ANESTHETIST, CERTIFIED REGISTERED

## 2023-12-10 PROCEDURE — 3700000018 HC OB ANESTHESIA C-SECTION: Performed by: OBSTETRICS & GYNECOLOGY

## 2023-12-10 PROCEDURE — 88307 TISSUE EXAM BY PATHOLOGIST: CPT | Mod: TC,SUR,STJLAB,WESLAB | Performed by: STUDENT IN AN ORGANIZED HEALTH CARE EDUCATION/TRAINING PROGRAM

## 2023-12-10 PROCEDURE — 1220000001 HC OB SEMI-PRIVATE ROOM DAILY

## 2023-12-10 PROCEDURE — 7210000002 HC LABOR PER HOUR

## 2023-12-10 PROCEDURE — 2500000004 HC RX 250 GENERAL PHARMACY W/ HCPCS (ALT 636 FOR OP/ED): Performed by: OBSTETRICS & GYNECOLOGY

## 2023-12-10 PROCEDURE — 83735 ASSAY OF MAGNESIUM: CPT | Performed by: STUDENT IN AN ORGANIZED HEALTH CARE EDUCATION/TRAINING PROGRAM

## 2023-12-10 PROCEDURE — 59050 FETAL MONITOR W/REPORT: CPT

## 2023-12-10 PROCEDURE — 2500000004 HC RX 250 GENERAL PHARMACY W/ HCPCS (ALT 636 FOR OP/ED): Performed by: NURSE ANESTHETIST, CERTIFIED REGISTERED

## 2023-12-10 PROCEDURE — 2500000005 HC RX 250 GENERAL PHARMACY W/O HCPCS: Performed by: STUDENT IN AN ORGANIZED HEALTH CARE EDUCATION/TRAINING PROGRAM

## 2023-12-10 PROCEDURE — 88307 TISSUE EXAM BY PATHOLOGIST: CPT | Performed by: PATHOLOGY

## 2023-12-10 PROCEDURE — P9016 RBC LEUKOCYTES REDUCED: HCPCS

## 2023-12-10 PROCEDURE — 85610 PROTHROMBIN TIME: CPT | Performed by: OBSTETRICS & GYNECOLOGY

## 2023-12-10 PROCEDURE — 3700000014 HC AN EPIDURAL BLOCK CHARGE: Performed by: OBSTETRICS & GYNECOLOGY

## 2023-12-10 PROCEDURE — 2720000007 HC OR 272 NO HCPCS

## 2023-12-10 PROCEDURE — 2500000004 HC RX 250 GENERAL PHARMACY W/ HCPCS (ALT 636 FOR OP/ED)

## 2023-12-10 PROCEDURE — 7220000003 HC JADA OB SPECIAL CARE, 4 HOURS

## 2023-12-10 PROCEDURE — 2720000007 HC OR 272 NO HCPCS: Performed by: OBSTETRICS & GYNECOLOGY

## 2023-12-10 PROCEDURE — 85730 THROMBOPLASTIN TIME PARTIAL: CPT | Performed by: OBSTETRICS & GYNECOLOGY

## 2023-12-10 PROCEDURE — 7100000016 HC LABOR RECOVERY PER HOUR: Performed by: OBSTETRICS & GYNECOLOGY

## 2023-12-10 PROCEDURE — 59510 CESAREAN DELIVERY: CPT | Performed by: STUDENT IN AN ORGANIZED HEALTH CARE EDUCATION/TRAINING PROGRAM

## 2023-12-10 PROCEDURE — 85384 FIBRINOGEN ACTIVITY: CPT | Performed by: OBSTETRICS & GYNECOLOGY

## 2023-12-10 PROCEDURE — 71045 X-RAY EXAM CHEST 1 VIEW: CPT | Performed by: RADIOLOGY

## 2023-12-10 PROCEDURE — 2500000001 HC RX 250 WO HCPCS SELF ADMINISTERED DRUGS (ALT 637 FOR MEDICARE OP): Performed by: STUDENT IN AN ORGANIZED HEALTH CARE EDUCATION/TRAINING PROGRAM

## 2023-12-10 PROCEDURE — 2500000005 HC RX 250 GENERAL PHARMACY W/O HCPCS: Performed by: NURSE ANESTHETIST, CERTIFIED REGISTERED

## 2023-12-10 PROCEDURE — 0501F PRENATAL FLOW SHEET: CPT

## 2023-12-10 PROCEDURE — 3E0H7GC INTRODUCTION OF OTHER THERAPEUTIC SUBSTANCE INTO LOWER GI, VIA NATURAL OR ARTIFICIAL OPENING: ICD-10-PCS | Performed by: STUDENT IN AN ORGANIZED HEALTH CARE EDUCATION/TRAINING PROGRAM

## 2023-12-10 PROCEDURE — 80053 COMPREHEN METABOLIC PANEL: CPT | Performed by: STUDENT IN AN ORGANIZED HEALTH CARE EDUCATION/TRAINING PROGRAM

## 2023-12-10 PROCEDURE — 59514 CESAREAN DELIVERY ONLY: CPT | Performed by: OBSTETRICS & GYNECOLOGY

## 2023-12-10 PROCEDURE — 80048 BASIC METABOLIC PNL TOTAL CA: CPT | Mod: CCI | Performed by: OBSTETRICS & GYNECOLOGY

## 2023-12-10 PROCEDURE — 85027 COMPLETE CBC AUTOMATED: CPT | Performed by: OBSTETRICS & GYNECOLOGY

## 2023-12-10 RX ORDER — KETOROLAC TROMETHAMINE 30 MG/ML
INJECTION, SOLUTION INTRAMUSCULAR; INTRAVENOUS AS NEEDED
Status: DISCONTINUED | OUTPATIENT
Start: 2023-12-10 | End: 2023-12-10

## 2023-12-10 RX ORDER — DEXMEDETOMIDINE HYDROCHLORIDE 100 UG/ML
INJECTION, SOLUTION INTRAVENOUS AS NEEDED
Status: DISCONTINUED | OUTPATIENT
Start: 2023-12-10 | End: 2023-12-10

## 2023-12-10 RX ORDER — HYDRALAZINE HYDROCHLORIDE 20 MG/ML
5 INJECTION INTRAMUSCULAR; INTRAVENOUS ONCE AS NEEDED
Status: DISCONTINUED | OUTPATIENT
Start: 2023-12-10 | End: 2023-12-15 | Stop reason: HOSPADM

## 2023-12-10 RX ORDER — OXYTOCIN 10 [USP'U]/ML
10 INJECTION, SOLUTION INTRAMUSCULAR; INTRAVENOUS ONCE AS NEEDED
Status: DISCONTINUED | OUTPATIENT
Start: 2023-12-10 | End: 2023-12-15 | Stop reason: HOSPADM

## 2023-12-10 RX ORDER — LABETALOL HYDROCHLORIDE 5 MG/ML
20 INJECTION, SOLUTION INTRAVENOUS ONCE AS NEEDED
Status: DISCONTINUED | OUTPATIENT
Start: 2023-12-10 | End: 2023-12-15 | Stop reason: HOSPADM

## 2023-12-10 RX ORDER — FENTANYL CITRATE 50 UG/ML
INJECTION, SOLUTION INTRAMUSCULAR; INTRAVENOUS AS NEEDED
Status: DISCONTINUED | OUTPATIENT
Start: 2023-12-10 | End: 2023-12-10

## 2023-12-10 RX ORDER — OXYCODONE HYDROCHLORIDE 5 MG/1
10 TABLET ORAL EVERY 4 HOURS PRN
Status: DISCONTINUED | OUTPATIENT
Start: 2023-12-11 | End: 2023-12-10

## 2023-12-10 RX ORDER — IBUPROFEN 600 MG/1
600 TABLET ORAL EVERY 6 HOURS
Status: DISCONTINUED | OUTPATIENT
Start: 2023-12-11 | End: 2023-12-15 | Stop reason: HOSPADM

## 2023-12-10 RX ORDER — HYDRALAZINE HYDROCHLORIDE 20 MG/ML
5 INJECTION INTRAMUSCULAR; INTRAVENOUS ONCE AS NEEDED
Status: DISCONTINUED | OUTPATIENT
Start: 2023-12-10 | End: 2023-12-10

## 2023-12-10 RX ORDER — MIDAZOLAM HYDROCHLORIDE 1 MG/ML
INJECTION INTRAMUSCULAR; INTRAVENOUS AS NEEDED
Status: DISCONTINUED | OUTPATIENT
Start: 2023-12-10 | End: 2023-12-10

## 2023-12-10 RX ORDER — METHYLERGONOVINE MALEATE 0.2 MG/ML
0.2 INJECTION INTRAVENOUS ONCE AS NEEDED
Status: DISCONTINUED | OUTPATIENT
Start: 2023-12-10 | End: 2023-12-10

## 2023-12-10 RX ORDER — OXYTOCIN/0.9 % SODIUM CHLORIDE 30/500 ML
60 PLASTIC BAG, INJECTION (ML) INTRAVENOUS ONCE AS NEEDED
Status: DISCONTINUED | OUTPATIENT
Start: 2023-12-10 | End: 2023-12-15 | Stop reason: HOSPADM

## 2023-12-10 RX ORDER — OXYCODONE HYDROCHLORIDE 5 MG/1
10 TABLET ORAL EVERY 4 HOURS PRN
Status: DISCONTINUED | OUTPATIENT
Start: 2023-12-11 | End: 2023-12-15 | Stop reason: HOSPADM

## 2023-12-10 RX ORDER — OXYTOCIN 10 [USP'U]/ML
INJECTION, SOLUTION INTRAMUSCULAR; INTRAVENOUS CODE/TRAUMA/SEDATION MEDICATION
Status: DISCONTINUED | OUTPATIENT
Start: 2023-12-10 | End: 2023-12-15 | Stop reason: HOSPADM

## 2023-12-10 RX ORDER — KETOROLAC TROMETHAMINE 30 MG/ML
30 INJECTION, SOLUTION INTRAMUSCULAR; INTRAVENOUS EVERY 6 HOURS
Status: DISCONTINUED | OUTPATIENT
Start: 2023-12-10 | End: 2023-12-10

## 2023-12-10 RX ORDER — METHYLERGONOVINE MALEATE 0.2 MG/ML
0.2 INJECTION INTRAVENOUS ONCE AS NEEDED
Status: DISCONTINUED | OUTPATIENT
Start: 2023-12-10 | End: 2023-12-15 | Stop reason: HOSPADM

## 2023-12-10 RX ORDER — MORPHINE SULFATE 1 MG/ML
INJECTION, SOLUTION EPIDURAL; INTRATHECAL; INTRAVENOUS AS NEEDED
Status: DISCONTINUED | OUTPATIENT
Start: 2023-12-10 | End: 2023-12-10

## 2023-12-10 RX ORDER — MISOPROSTOL 200 UG/1
800 TABLET ORAL ONCE AS NEEDED
Status: DISCONTINUED | OUTPATIENT
Start: 2023-12-10 | End: 2023-12-15 | Stop reason: HOSPADM

## 2023-12-10 RX ORDER — ACETAMINOPHEN 325 MG/1
975 TABLET ORAL EVERY 6 HOURS
Status: DISCONTINUED | OUTPATIENT
Start: 2023-12-10 | End: 2023-12-15 | Stop reason: HOSPADM

## 2023-12-10 RX ORDER — OXYCODONE HYDROCHLORIDE 5 MG/1
5 TABLET ORAL EVERY 4 HOURS PRN
Status: DISCONTINUED | OUTPATIENT
Start: 2023-12-11 | End: 2023-12-15 | Stop reason: HOSPADM

## 2023-12-10 RX ORDER — DIPHENHYDRAMINE HYDROCHLORIDE 50 MG/ML
25 INJECTION INTRAMUSCULAR; INTRAVENOUS EVERY 4 HOURS PRN
Status: DISCONTINUED | OUTPATIENT
Start: 2023-12-10 | End: 2023-12-15 | Stop reason: HOSPADM

## 2023-12-10 RX ORDER — LABETALOL HYDROCHLORIDE 5 MG/ML
20 INJECTION, SOLUTION INTRAVENOUS ONCE AS NEEDED
Status: DISCONTINUED | OUTPATIENT
Start: 2023-12-10 | End: 2023-12-10

## 2023-12-10 RX ORDER — ACETAMINOPHEN 325 MG/1
975 TABLET ORAL EVERY 6 HOURS
Status: DISCONTINUED | OUTPATIENT
Start: 2023-12-10 | End: 2023-12-11

## 2023-12-10 RX ORDER — HYDROMORPHONE HYDROCHLORIDE 1 MG/ML
0.2 INJECTION, SOLUTION INTRAMUSCULAR; INTRAVENOUS; SUBCUTANEOUS EVERY 5 MIN PRN
Status: DISCONTINUED | OUTPATIENT
Start: 2023-12-10 | End: 2023-12-10 | Stop reason: HOSPADM

## 2023-12-10 RX ORDER — ONDANSETRON HYDROCHLORIDE 2 MG/ML
4 INJECTION, SOLUTION INTRAVENOUS EVERY 6 HOURS PRN
Status: DISCONTINUED | OUTPATIENT
Start: 2023-12-10 | End: 2023-12-15 | Stop reason: HOSPADM

## 2023-12-10 RX ORDER — ONDANSETRON 4 MG/1
4 TABLET, FILM COATED ORAL EVERY 6 HOURS PRN
Status: DISCONTINUED | OUTPATIENT
Start: 2023-12-10 | End: 2023-12-15 | Stop reason: HOSPADM

## 2023-12-10 RX ORDER — ENOXAPARIN SODIUM 100 MG/ML
40 INJECTION SUBCUTANEOUS EVERY 24 HOURS
Status: DISCONTINUED | OUTPATIENT
Start: 2023-12-11 | End: 2023-12-10

## 2023-12-10 RX ORDER — MISOPROSTOL 200 UG/1
TABLET ORAL CODE/TRAUMA/SEDATION MEDICATION
Status: DISCONTINUED | OUTPATIENT
Start: 2023-12-10 | End: 2023-12-15 | Stop reason: HOSPADM

## 2023-12-10 RX ORDER — PHENYLEPHRINE HCL IN 0.9% NACL 1 MG/10 ML
SYRINGE (ML) INTRAVENOUS AS NEEDED
Status: DISCONTINUED | OUTPATIENT
Start: 2023-12-10 | End: 2023-12-10

## 2023-12-10 RX ORDER — CARBOPROST TROMETHAMINE 250 UG/ML
250 INJECTION, SOLUTION INTRAMUSCULAR ONCE AS NEEDED
Status: DISCONTINUED | OUTPATIENT
Start: 2023-12-10 | End: 2023-12-15 | Stop reason: HOSPADM

## 2023-12-10 RX ORDER — BISACODYL 10 MG/1
10 SUPPOSITORY RECTAL DAILY PRN
Status: DISCONTINUED | OUTPATIENT
Start: 2023-12-10 | End: 2023-12-10

## 2023-12-10 RX ORDER — TRANEXAMIC ACID 100 MG/ML
1000 INJECTION, SOLUTION INTRAVENOUS ONCE AS NEEDED
Status: DISCONTINUED | OUTPATIENT
Start: 2023-12-10 | End: 2023-12-10

## 2023-12-10 RX ORDER — NIFEDIPINE 10 MG/1
10 CAPSULE ORAL ONCE AS NEEDED
Status: DISCONTINUED | OUTPATIENT
Start: 2023-12-10 | End: 2023-12-10

## 2023-12-10 RX ORDER — HYDROMORPHONE HYDROCHLORIDE 1 MG/ML
0.2 INJECTION, SOLUTION INTRAMUSCULAR; INTRAVENOUS; SUBCUTANEOUS EVERY 5 MIN PRN
Status: DISCONTINUED | OUTPATIENT
Start: 2023-12-10 | End: 2023-12-10

## 2023-12-10 RX ORDER — ADHESIVE BANDAGE
10 BANDAGE TOPICAL
Status: DISCONTINUED | OUTPATIENT
Start: 2023-12-10 | End: 2023-12-10

## 2023-12-10 RX ORDER — OXYTOCIN 10 [USP'U]/ML
10 INJECTION, SOLUTION INTRAMUSCULAR; INTRAVENOUS ONCE AS NEEDED
Status: DISCONTINUED | OUTPATIENT
Start: 2023-12-10 | End: 2023-12-10

## 2023-12-10 RX ORDER — ONDANSETRON 4 MG/1
4 TABLET, FILM COATED ORAL EVERY 6 HOURS PRN
Status: DISCONTINUED | OUTPATIENT
Start: 2023-12-10 | End: 2023-12-10

## 2023-12-10 RX ORDER — ENOXAPARIN SODIUM 100 MG/ML
40 INJECTION SUBCUTANEOUS EVERY 24 HOURS
Status: DISCONTINUED | OUTPATIENT
Start: 2023-12-11 | End: 2023-12-15 | Stop reason: HOSPADM

## 2023-12-10 RX ORDER — OXYTOCIN/0.9 % SODIUM CHLORIDE 30/500 ML
60 PLASTIC BAG, INJECTION (ML) INTRAVENOUS ONCE AS NEEDED
Status: DISCONTINUED | OUTPATIENT
Start: 2023-12-10 | End: 2023-12-10

## 2023-12-10 RX ORDER — KETOROLAC TROMETHAMINE 30 MG/ML
30 INJECTION, SOLUTION INTRAMUSCULAR; INTRAVENOUS EVERY 6 HOURS
Status: COMPLETED | OUTPATIENT
Start: 2023-12-10 | End: 2023-12-11

## 2023-12-10 RX ORDER — ADHESIVE BANDAGE
10 BANDAGE TOPICAL
Status: DISCONTINUED | OUTPATIENT
Start: 2023-12-10 | End: 2023-12-15 | Stop reason: HOSPADM

## 2023-12-10 RX ORDER — DEXAMETHASONE SODIUM PHOSPHATE 4 MG/ML
INJECTION, SOLUTION INTRA-ARTICULAR; INTRALESIONAL; INTRAMUSCULAR; INTRAVENOUS; SOFT TISSUE AS NEEDED
Status: DISCONTINUED | OUTPATIENT
Start: 2023-12-10 | End: 2023-12-10

## 2023-12-10 RX ORDER — LIDOCAINE HCL/EPINEPHRINE/PF 2%-1:200K
VIAL (ML) INJECTION AS NEEDED
Status: DISCONTINUED | OUTPATIENT
Start: 2023-12-10 | End: 2023-12-10

## 2023-12-10 RX ORDER — CARBOPROST TROMETHAMINE 250 UG/ML
250 INJECTION, SOLUTION INTRAMUSCULAR ONCE AS NEEDED
Status: DISCONTINUED | OUTPATIENT
Start: 2023-12-10 | End: 2023-12-10

## 2023-12-10 RX ORDER — MISOPROSTOL 200 UG/1
800 TABLET ORAL ONCE AS NEEDED
Status: DISCONTINUED | OUTPATIENT
Start: 2023-12-10 | End: 2023-12-10

## 2023-12-10 RX ORDER — NIFEDIPINE 10 MG/1
10 CAPSULE ORAL ONCE AS NEEDED
Status: DISCONTINUED | OUTPATIENT
Start: 2023-12-10 | End: 2023-12-15 | Stop reason: HOSPADM

## 2023-12-10 RX ORDER — POLYETHYLENE GLYCOL 3350 17 G/17G
17 POWDER, FOR SOLUTION ORAL 2 TIMES DAILY PRN
Status: DISCONTINUED | OUTPATIENT
Start: 2023-12-10 | End: 2023-12-15 | Stop reason: HOSPADM

## 2023-12-10 RX ORDER — ALBUMIN HUMAN 50 G/1000ML
SOLUTION INTRAVENOUS AS NEEDED
Status: DISCONTINUED | OUTPATIENT
Start: 2023-12-10 | End: 2023-12-10

## 2023-12-10 RX ORDER — SIMETHICONE 80 MG
80 TABLET,CHEWABLE ORAL 4 TIMES DAILY PRN
Status: DISCONTINUED | OUTPATIENT
Start: 2023-12-10 | End: 2023-12-15 | Stop reason: HOSPADM

## 2023-12-10 RX ORDER — CARBOPROST TROMETHAMINE 250 UG/ML
INJECTION, SOLUTION INTRAMUSCULAR CODE/TRAUMA/SEDATION MEDICATION
Status: DISCONTINUED | OUTPATIENT
Start: 2023-12-10 | End: 2023-12-15 | Stop reason: HOSPADM

## 2023-12-10 RX ORDER — TRANEXAMIC ACID 100 MG/ML
1000 INJECTION, SOLUTION INTRAVENOUS ONCE AS NEEDED
Status: DISCONTINUED | OUTPATIENT
Start: 2023-12-10 | End: 2023-12-15 | Stop reason: HOSPADM

## 2023-12-10 RX ORDER — NALOXONE HYDROCHLORIDE 0.4 MG/ML
0.1 INJECTION, SOLUTION INTRAMUSCULAR; INTRAVENOUS; SUBCUTANEOUS EVERY 5 MIN PRN
Status: DISCONTINUED | OUTPATIENT
Start: 2023-12-10 | End: 2023-12-10

## 2023-12-10 RX ORDER — BISACODYL 10 MG/1
10 SUPPOSITORY RECTAL DAILY PRN
Status: DISCONTINUED | OUTPATIENT
Start: 2023-12-10 | End: 2023-12-15 | Stop reason: HOSPADM

## 2023-12-10 RX ORDER — LIDOCAINE HYDROCHLORIDE 10 MG/ML
INJECTION, SOLUTION EPIDURAL; INFILTRATION; INTRACAUDAL; PERINEURAL
Status: DISCONTINUED
Start: 2023-12-10 | End: 2023-12-10 | Stop reason: WASHOUT

## 2023-12-10 RX ORDER — LOPERAMIDE HYDROCHLORIDE 2 MG/1
4 CAPSULE ORAL EVERY 2 HOUR PRN
Status: DISCONTINUED | OUTPATIENT
Start: 2023-12-10 | End: 2023-12-15 | Stop reason: HOSPADM

## 2023-12-10 RX ORDER — IBUPROFEN 600 MG/1
600 TABLET ORAL EVERY 6 HOURS
Status: DISCONTINUED | OUTPATIENT
Start: 2023-12-11 | End: 2023-12-10

## 2023-12-10 RX ORDER — ONDANSETRON HYDROCHLORIDE 2 MG/ML
4 INJECTION, SOLUTION INTRAVENOUS EVERY 6 HOURS PRN
Status: DISCONTINUED | OUTPATIENT
Start: 2023-12-10 | End: 2023-12-10

## 2023-12-10 RX ORDER — LIDOCAINE HCL/EPINEPHRINE/PF 2%-1:200K
VIAL (ML) INJECTION
Status: COMPLETED
Start: 2023-12-10 | End: 2023-12-10

## 2023-12-10 RX ORDER — CEFAZOLIN SODIUM 2 G/100ML
2 INJECTION, SOLUTION INTRAVENOUS ONCE
Status: COMPLETED | OUTPATIENT
Start: 2023-12-10 | End: 2023-12-10

## 2023-12-10 RX ORDER — NALBUPHINE HYDROCHLORIDE 10 MG/ML
5 INJECTION, SOLUTION INTRAMUSCULAR; INTRAVENOUS; SUBCUTANEOUS
Status: DISCONTINUED | OUTPATIENT
Start: 2023-12-10 | End: 2023-12-10

## 2023-12-10 RX ORDER — LIDOCAINE 560 MG/1
1 PATCH PERCUTANEOUS; TOPICAL; TRANSDERMAL
Status: DISCONTINUED | OUTPATIENT
Start: 2023-12-10 | End: 2023-12-15 | Stop reason: HOSPADM

## 2023-12-10 RX ORDER — SCOLOPAMINE TRANSDERMAL SYSTEM 1 MG/1
PATCH, EXTENDED RELEASE TRANSDERMAL AS NEEDED
Status: DISCONTINUED | OUTPATIENT
Start: 2023-12-10 | End: 2023-12-10

## 2023-12-10 RX ORDER — LIDOCAINE 560 MG/1
1 PATCH PERCUTANEOUS; TOPICAL; TRANSDERMAL
Status: DISCONTINUED | OUTPATIENT
Start: 2023-12-10 | End: 2023-12-10

## 2023-12-10 RX ORDER — DIPHENHYDRAMINE HCL 25 MG
25 CAPSULE ORAL EVERY 4 HOURS PRN
Status: DISCONTINUED | OUTPATIENT
Start: 2023-12-10 | End: 2023-12-10

## 2023-12-10 RX ORDER — NALBUPHINE HYDROCHLORIDE 10 MG/ML
5 INJECTION, SOLUTION INTRAMUSCULAR; INTRAVENOUS; SUBCUTANEOUS
Status: ACTIVE | OUTPATIENT
Start: 2023-12-10 | End: 2023-12-11

## 2023-12-10 RX ORDER — DIPHENHYDRAMINE HCL 25 MG
25 CAPSULE ORAL EVERY 4 HOURS PRN
Status: DISCONTINUED | OUTPATIENT
Start: 2023-12-10 | End: 2023-12-15 | Stop reason: HOSPADM

## 2023-12-10 RX ORDER — OXYCODONE HYDROCHLORIDE 5 MG/1
5 TABLET ORAL EVERY 4 HOURS PRN
Status: DISCONTINUED | OUTPATIENT
Start: 2023-12-11 | End: 2023-12-10

## 2023-12-10 RX ORDER — DIPHENHYDRAMINE HYDROCHLORIDE 50 MG/ML
25 INJECTION INTRAMUSCULAR; INTRAVENOUS EVERY 4 HOURS PRN
Status: DISCONTINUED | OUTPATIENT
Start: 2023-12-10 | End: 2023-12-10

## 2023-12-10 RX ORDER — POLYETHYLENE GLYCOL 3350 17 G/17G
17 POWDER, FOR SOLUTION ORAL 2 TIMES DAILY PRN
Status: DISCONTINUED | OUTPATIENT
Start: 2023-12-10 | End: 2023-12-10

## 2023-12-10 RX ORDER — AZITHROMYCIN 100 MG/ML
INJECTION, POWDER, LYOPHILIZED, FOR SOLUTION INTRAVENOUS AS NEEDED
Status: DISCONTINUED | OUTPATIENT
Start: 2023-12-10 | End: 2023-12-10

## 2023-12-10 RX ORDER — LOPERAMIDE HYDROCHLORIDE 2 MG/1
4 CAPSULE ORAL EVERY 2 HOUR PRN
Status: DISCONTINUED | OUTPATIENT
Start: 2023-12-10 | End: 2023-12-10

## 2023-12-10 RX ORDER — SIMETHICONE 80 MG
80 TABLET,CHEWABLE ORAL 4 TIMES DAILY PRN
Status: DISCONTINUED | OUTPATIENT
Start: 2023-12-10 | End: 2023-12-10

## 2023-12-10 RX ORDER — HYDROMORPHONE HYDROCHLORIDE 1 MG/ML
0.2 INJECTION, SOLUTION INTRAMUSCULAR; INTRAVENOUS; SUBCUTANEOUS EVERY 5 MIN PRN
Status: DISCONTINUED | OUTPATIENT
Start: 2023-12-10 | End: 2023-12-15 | Stop reason: HOSPADM

## 2023-12-10 RX ORDER — NALOXONE HYDROCHLORIDE 0.4 MG/ML
0.1 INJECTION, SOLUTION INTRAMUSCULAR; INTRAVENOUS; SUBCUTANEOUS EVERY 5 MIN PRN
Status: DISCONTINUED | OUTPATIENT
Start: 2023-12-10 | End: 2023-12-15 | Stop reason: HOSPADM

## 2023-12-10 RX ADMIN — ONDANSETRON HYDROCHLORIDE 4 MG: 4 TABLET, FILM COATED ORAL at 07:41

## 2023-12-10 RX ADMIN — ONDANSETRON 4 MG: 2 INJECTION INTRAMUSCULAR; INTRAVENOUS at 02:00

## 2023-12-10 RX ADMIN — FENTANYL CITRATE 100 MCG: 50 INJECTION, SOLUTION INTRAMUSCULAR; INTRAVENOUS at 05:13

## 2023-12-10 RX ADMIN — DEXMEDETOMIDINE HCL 10 MCG: 100 INJECTION INTRAVENOUS at 08:34

## 2023-12-10 RX ADMIN — CEFAZOLIN SODIUM 2 G: 2 INJECTION, SOLUTION INTRAVENOUS at 07:25

## 2023-12-10 RX ADMIN — FENTANYL CITRATE 25 MCG: 50 INJECTION, SOLUTION INTRAMUSCULAR; INTRAVENOUS at 08:13

## 2023-12-10 RX ADMIN — Medication 100 MCG: at 08:35

## 2023-12-10 RX ADMIN — MIDAZOLAM HYDROCHLORIDE 2 MG: 1 INJECTION, SOLUTION INTRAMUSCULAR; INTRAVENOUS at 08:15

## 2023-12-10 RX ADMIN — LIDOCAINE HYDROCHLORIDE 20 MG: 20 INJECTION, SOLUTION EPIDURAL; INFILTRATION; INTRACAUDAL; PERINEURAL at 08:28

## 2023-12-10 RX ADMIN — LIDOCAINE HYDROCHLORIDE 100 MG: 20 INJECTION, SOLUTION EPIDURAL; INFILTRATION; INTRACAUDAL; PERINEURAL at 07:52

## 2023-12-10 RX ADMIN — CARBOPROST TROMETHAMINE 250 MCG: 250 INJECTION, SOLUTION INTRAMUSCULAR at 07:55

## 2023-12-10 RX ADMIN — TRANEXAMIC ACID 1000 MG: 1 INJECTION, SOLUTION INTRAVENOUS at 10:05

## 2023-12-10 RX ADMIN — SCOLOPAMINE TRANSDERMAL SYSTEM 1 PATCH: 1 PATCH, EXTENDED RELEASE TRANSDERMAL at 08:05

## 2023-12-10 RX ADMIN — LIDOCAINE HYDROCHLORIDE,EPINEPHRINE BITARTRATE 5 ML: 20; .005 INJECTION, SOLUTION EPIDURAL; INFILTRATION; INTRACAUDAL; PERINEURAL at 07:21

## 2023-12-10 RX ADMIN — MISOPROSTOL 800 MCG: 200 TABLET ORAL at 08:00

## 2023-12-10 RX ADMIN — DEXMEDETOMIDINE HCL 10 MCG: 100 INJECTION INTRAVENOUS at 07:57

## 2023-12-10 RX ADMIN — SODIUM CHLORIDE, POTASSIUM CHLORIDE, SODIUM LACTATE AND CALCIUM CHLORIDE: 600; 310; 30; 20 INJECTION, SOLUTION INTRAVENOUS at 07:50

## 2023-12-10 RX ADMIN — Medication 10 ML/HR: at 04:27

## 2023-12-10 RX ADMIN — LIDOCAINE HYDROCHLORIDE,EPINEPHRINE BITARTRATE 5 ML: 20; .005 INJECTION, SOLUTION EPIDURAL; INFILTRATION; INTRACAUDAL; PERINEURAL at 05:13

## 2023-12-10 RX ADMIN — OXYTOCIN 10 UNITS: 10 INJECTION INTRAVENOUS at 08:00

## 2023-12-10 RX ADMIN — ALBUMIN (HUMAN) 250 ML: 12.5 SOLUTION INTRAVENOUS at 08:35

## 2023-12-10 RX ADMIN — DEXAMETHASONE SODIUM PHOSPHATE 4 MG: 4 INJECTION, SOLUTION INTRAMUSCULAR; INTRAVENOUS at 07:59

## 2023-12-10 RX ADMIN — FENTANYL CITRATE 50 MCG: 50 INJECTION, SOLUTION INTRAMUSCULAR; INTRAVENOUS at 07:55

## 2023-12-10 RX ADMIN — LIDOCAINE HYDROCHLORIDE,EPINEPHRINE BITARTRATE 5 ML: 20; .005 INJECTION, SOLUTION EPIDURAL; INFILTRATION; INTRACAUDAL; PERINEURAL at 05:09

## 2023-12-10 RX ADMIN — MIDAZOLAM HYDROCHLORIDE 1 MG: 1 INJECTION, SOLUTION INTRAMUSCULAR; INTRAVENOUS at 08:38

## 2023-12-10 RX ADMIN — DEXMEDETOMIDINE HCL 10 MCG: 100 INJECTION INTRAVENOUS at 07:50

## 2023-12-10 RX ADMIN — FENTANYL CITRATE 25 MCG: 50 INJECTION, SOLUTION INTRAMUSCULAR; INTRAVENOUS at 08:11

## 2023-12-10 RX ADMIN — LIDOCAINE HYDROCHLORIDE 60 MG: 20 INJECTION, SOLUTION EPIDURAL; INFILTRATION; INTRACAUDAL; PERINEURAL at 08:10

## 2023-12-10 RX ADMIN — KETOROLAC TROMETHAMINE 30 MG: 30 INJECTION, SOLUTION INTRAMUSCULAR; INTRAVENOUS at 15:39

## 2023-12-10 RX ADMIN — POTASSIUM CHLORIDE 20 MEQ: 1.5 POWDER, FOR SOLUTION ORAL at 00:38

## 2023-12-10 RX ADMIN — KETOROLAC TROMETHAMINE 30 MG: 30 INJECTION, SOLUTION INTRAMUSCULAR; INTRAVENOUS at 21:26

## 2023-12-10 RX ADMIN — Medication 100 MCG: at 07:55

## 2023-12-10 RX ADMIN — Medication 100 MCG: at 09:03

## 2023-12-10 RX ADMIN — LIDOCAINE HYDROCHLORIDE 40 MG: 20 INJECTION, SOLUTION EPIDURAL; INFILTRATION; INTRACAUDAL; PERINEURAL at 08:12

## 2023-12-10 RX ADMIN — ACETAMINOPHEN 975 MG: 325 TABLET ORAL at 21:27

## 2023-12-10 RX ADMIN — LIDOCAINE HYDROCHLORIDE,EPINEPHRINE BITARTRATE 5 ML: 20; .005 INJECTION, SOLUTION EPIDURAL; INFILTRATION; INTRACAUDAL; PERINEURAL at 07:17

## 2023-12-10 RX ADMIN — HYDROMORPHONE HYDROCHLORIDE 0.2 MG: 1 INJECTION, SOLUTION INTRAMUSCULAR; INTRAVENOUS; SUBCUTANEOUS at 12:50

## 2023-12-10 RX ADMIN — ACETAMINOPHEN 975 MG: 325 TABLET ORAL at 15:39

## 2023-12-10 RX ADMIN — KETOROLAC TROMETHAMINE 30 MG: 30 INJECTION, SOLUTION INTRAMUSCULAR; INTRAVENOUS at 07:58

## 2023-12-10 RX ADMIN — MISOPROSTOL 800 MCG: 200 TABLET ORAL at 10:49

## 2023-12-10 RX ADMIN — MORPHINE SULFATE 2 MG: 1 INJECTION, SOLUTION EPIDURAL; INTRATHECAL; INTRAVENOUS at 07:47

## 2023-12-10 RX ADMIN — CEFAZOLIN SODIUM 2 G: 2 INJECTION, SOLUTION INTRAVENOUS at 08:30

## 2023-12-10 RX ADMIN — ALBUMIN (HUMAN) 250 ML: 12.5 SOLUTION INTRAVENOUS at 08:22

## 2023-12-10 RX ADMIN — AZITHROMYCIN MONOHYDRATE 500 MG: 500 INJECTION, POWDER, LYOPHILIZED, FOR SOLUTION INTRAVENOUS at 07:30

## 2023-12-10 RX ADMIN — LIDOCAINE HYDROCHLORIDE 80 MG: 20 INJECTION, SOLUTION EPIDURAL; INFILTRATION; INTRACAUDAL; PERINEURAL at 08:24

## 2023-12-10 RX ADMIN — OXYTOCIN 600 MILLI-UNITS/MIN: 10 INJECTION, SOLUTION INTRAMUSCULAR; INTRAVENOUS at 07:47

## 2023-12-10 RX ADMIN — ONDANSETRON 4 MG: 2 INJECTION INTRAMUSCULAR; INTRAVENOUS at 07:24

## 2023-12-10 ASSESSMENT — PAIN SCALES - GENERAL
PAINLEVEL_OUTOF10: 0 - NO PAIN
PAINLEVEL_OUTOF10: 0 - NO PAIN
PAINLEVEL_OUTOF10: 7
PAINLEVEL_OUTOF10: 8
PAINLEVEL_OUTOF10: 0 - NO PAIN
PAINLEVEL_OUTOF10: 0 - NO PAIN
PAINLEVEL_OUTOF10: 5 - MODERATE PAIN
PAINLEVEL_OUTOF10: 7
PAINLEVEL_OUTOF10: 0 - NO PAIN
PAINLEVEL_OUTOF10: 8
PAINLEVEL_OUTOF10: 7
PAINLEVEL_OUTOF10: 0 - NO PAIN
PAINLEVEL_OUTOF10: 4
PAINLEVEL_OUTOF10: 0 - NO PAIN
PAINLEVEL_OUTOF10: 3
PAINLEVEL_OUTOF10: 0 - NO PAIN
PAINLEVEL_OUTOF10: 2
PAINLEVEL_OUTOF10: 2

## 2023-12-10 ASSESSMENT — PAIN DESCRIPTION - DESCRIPTORS
DESCRIPTORS: SORE
DESCRIPTORS: SORE
DESCRIPTORS: CRAMPING
DESCRIPTORS: SORE
DESCRIPTORS: CRAMPING

## 2023-12-10 NOTE — PROGRESS NOTES
MIGNON removed no significant bleeding. Vitals sign stable. H&H stable. Will transfer to [facility] to be with infant.

## 2023-12-10 NOTE — PROGRESS NOTES
SVE 9/100/+1. Cat 1 tracing. Pit at 14, continue to uptitrate per protocol. Will recheck in 2hr or sooner as indicated. T&C x1U pRBCs given protracted active phase. Signed out to Dr. Wanda Lee MD

## 2023-12-10 NOTE — CARE PLAN
The patient's goals for the shift include to have a healthy baby    The clinical goals for the shift include FHR remains reassuring during IOL    Over the shift, the patient did not make progress toward the following goals.

## 2023-12-10 NOTE — ANESTHESIA POSTPROCEDURE EVALUATION
Patient: Dary Costa    Procedure Summary       Date: 23 Room / Location: [facility] ST OB    Anesthesia Start: 541 Anesthesia Stop: 12/10/23 1032    Procedure:  Section (Abdomen) Diagnosis:     Surgeons: Chito Ya MD Responsible Provider: KRYSTINA Raymond    Anesthesia Type: epidural ASA Status: 2            Anesthesia Type: epidural    Vitals Value Taken Time   /103 12/10/23 1133   Temp 37.4 °C (99.3 °F) 12/10/23 1133   Pulse 94 12/10/23 1230   Resp 18 12/10/23 1133   SpO2 96 % 12/10/23 1230       Anesthesia Post Evaluation    Patient location during evaluation: bedside  Patient participation: complete - patient participated  Level of consciousness: awake and alert  Pain management: adequate  Airway patency: patent  Cardiovascular status: acceptable  Respiratory status: acceptable  Hydration status: acceptable  Postoperative Nausea and Vomiting: none  Comments: Epidural catheter removed by nursing. No redness, swelling, or drainage at puncture site.    Complete resolution of numbness. Patient is able to lift legs, bend at the knees, and ambulate.    Patient denies problems with urination.    Patient denies nausea, headache or severe back pain.         There were no known notable events for this encounter.

## 2023-12-10 NOTE — LACTATION NOTE
Lactation Consultant Note  Lactation Consultation  Reason for Consult: Initial assessment  Consultant Name: Dixon RN, IBCLC    Maternal Information  Has mother  before?: No  Infant to breast within first 2 hours of birth?: No  Breastfeeding Delayed Due to: Other (Comment) (maternal and infant status)  Exclusive Pump and Bottle Feed: Yes    Maternal Assessment  Breast Assessment: Medium, Soft  Nipple Assessment: Intact  [place] Assessment: Normal    Infant Assessment       Feeding Assessment  Nutrition Source: Breastmilk  Feeding Method: Feeding expressed breastmilk  Unable to assess infant feeding at this time: Infant unable to breastfeed to alteration in health status    LATCH TOOL       Breast Pump  Pump: Hospital grade electric pump  Frequency: 8-10 times per day  Duration: 15-20 minutes per session  Breast Shield Size and Type: 24 mm  Units of Volume: Drops    Other OB Lactation Tools       Patient Follow-up  Inpatient Lactation Follow-up Needed : Yes  Outpatient Lactation Follow-up: Recommended    Other OB Lactation Documentation  Maternal Risk Factors: Complicated delivery, Significant hemorrhage,  delivery    Recommendations/Summary  Mother is a 23 y.o. , primary c/s on 12/10 at 0745. Mother with significant blood loss, 3225 ml, infant transferred to [facility] for subgaleal hemorrhage per mom. Mother states her plan is to breastfeed. Pumping initiated due to mother-infant seperation. Taught setup, use on initiate setting, and cleaning of pump. Reviewed need to pump for 15-20min after each feed/at least every 3 hours. Any pumped milk to be stored until it can be brought to infant in NICU. Mother collected several milliliters of colostrum in pump in first session. Educated on milk storage guidelines.

## 2023-12-10 NOTE — PROGRESS NOTES
SVE 9/100/+1. Cat I tracing Pit at 20, continue to uptitrate per protocol.  Ctx inadequate at this time, will assess again in 2 hrs or sooner if indicated.     Jocelyn Muñoz MD

## 2023-12-10 NOTE — PROGRESS NOTES
Complete 10/100/1, patient pushing with good maternal effort.  FHT Cat I baseline 140, moderate variability and + accels anticipate     Jocelyn Muñoz MD

## 2023-12-10 NOTE — SIGNIFICANT EVENT
Decision for  delivery - arrest of descent    Patient complete and pushing for almost 3 hours with excellent maternal effort, and now significant maternal exhaustion, but minimal descent.  Discussed options with patient.  Patient desires  delivery at this time which is very reasonable.   mod +accels, no decels, [place] Q2-3 min ctx.  Tracing Cat I at this time, will proceed with non scheduled, non urgent  delivery once all personnel are available.  Risks, benefits and alternatives explained to patient with good understanding.      Jocelyn Muoñz MD

## 2023-12-10 NOTE — CARE PLAN
The patient's goals for the shift include to remain stable and work towards transfer to main [place] to be with     The clinical goals for the shift include to remain stable during recovery and post partum    Over the shift, the patient sargent progress toward the following goals.

## 2023-12-10 NOTE — PROGRESS NOTES
SVE 9.5/100/1 Significant change from my last exam, with very thin rim of cervix around fetal head.   mod +accels -decels Cat I, cEFM.  Max Pit at 30. Will recheck in 2 hrs unless indicated sooner.    Jocelyn Muñoz MD

## 2023-12-10 NOTE — L&D DELIVERY NOTE
OB Delivery Note  12/10/2023  Dary Costa  23 y.o.   , Low Transverse        Gestational Age: 39w4d  /Para:   Quantitative Blood Loss: Admission to Discharge: 1550 mL (2023  7:48 PM - 12/10/2023  8:45 AM)    Carrillo Costa [[id number]]      Labor Events    Rupture date/time: 2023 0850  Rupture type: Artificial  Fluid color: Clear  Fluid odor: None  Labor type: Induced Onset of Labor  Labor allowed to proceed with plans for an attempted vaginal birth?: Yes  Induction: Misoprostol, Oxytocin  Induction indications: Other  Complications: Uterine Atony, Hemorrhage  Additional complications: Arrest of descent, delivered, current hospitalization       Placenta    Placenta delivery date/time: 12/10/2023 0747  Placenta removal: Spontaneous  Placenta appearance: Intact  Placenta disposition: pathology       Cord    Vessels: 3 vessels  Complications: None  Delayed cord clamping?: Yes  Cord clamped date/time: 12/10/2023 0746  Cord blood disposition: Lab  Gases sent?: No  Stem cell collection (by provider): No       Lacerations    Episiotomy: None       Anesthesia    Method: Epidural       Operative Delivery    Forceps attempted?: No  Vacuum extractor attempted?: No       Shoulder Dystocia    Shoulder dystocia present?: No       [place] Delivery    Time head delivered: 12/10/2023 07:45:00  Birth date/time: 12/10/2024 07:45:00  Delivery type: , Low Transverse   categorization: primary   priority: routine  Indications for : Arrest of Descent  Incision type: low transverse  Complications: Uterine Atony, Hemorrhage       Resuscitation    Method: Suctioning, Tactile stimulation       Apgars    Living status: Living  Apgar Component Scores:  1 min.:  5 min.:  10 min.:  15 min.:  20 min.:    Skin color:  0  1       Heart rate:  2  2       Reflex irritability:  2  2       Muscle tone:  2  2       Respiratory effort:  2  2       Total:  8  9       Apgars assigned by:  Subjective:      Patient ID: Linnette Yap is a 65 y.o. female.    Chief Complaint:  Dyspnea on moderate exertion    HPI:  This 65-year-old female was seen in the office today after workup which showed dyspnea on moderate exertion from her severe aortic stenosis.  The patient had left heart catheterization which showed no significant coronary artery disease.  The patient had a history of hypertension hyperlipidemia type 2 diabetes mellitus and obesity.  History of paroxysmal atrial fibrillation she is on Eliquis    Review of patient's allergies indicates:   Allergen Reactions    Chloraseptic (benzocaine) Other (See Comments) and Shortness Of Breath    Chloraseptic [phenol] Swelling     Pt states throat closes up throat    Vioxx [rofecoxib] Hives    Bleach (sodium hypochlorite) Blisters     Blisters in palms on hands     Drug ingredient [celecoxib]     Levothyroxine Other (See Comments)     Can only use Synthroid not generic     Metformin Diarrhea     Have to have brand name drug Fortamet.    Cannot take generic, does not work       Past Medical History:   Diagnosis Date    Acute non-recurrent frontal sinusitis 06/18/2019    Allergy     Anxiety     Aortic stenosis     Aortic stenosis 01/29/2018    Atrial fibrillation     Cholangitis 12/29/2018    Cholecystitis with cholangitis 12/29/2018    Choledocholithiasis 02/05/2019    Diabetes mellitus with coincident hypertension 10/19/2018    Diabetes mellitus, type 2     DM (diabetes mellitus) 2017     am 07/02/2020    Essential hypertension 01/29/2018    Essential hypertension 01/29/2018    Essential hypertension 01/29/2018    Fibromyalgia     Glaucoma     Hearing loss     Hyperlipidemia     Hypersomnia 03/19/2019    Polysomnogram    Immunization deficiency 02/06/2019    Memory deficit     Neuropathy 03/13/2020    Neuropathy, diabetic 2014    Osteoarthritis     Other constipation 06/27/2018    Patella fracture     patella fimal knee    Poor sleep pattern  06/19/2019    Pure hypercholesterolemia 01/29/2018    Rash 05/06/2019    Recurrent falls     Screening for HIV (human immunodeficiency virus) 01/05/2021    Seborrhea 05/14/2019    Septic shock 12/29/2018    Sleep apnea     Spondylopathy 12/16/2019    Stenosis of aortic and mitral valves     Thyroid disease     Tremors of nervous system     Type 2 diabetes mellitus without complication, without long-term current use of insulin 01/29/2018    Vertigo        Family History   Problem Relation Age of Onset    Atrial fibrillation Sister     Breast cancer Sister     Hypertension Sister     Depression Sister     Obesity Sister     Thyroid disease Sister     Fibroids Sister     Hyperlipidemia Sister     Sleep apnea Sister     Vision loss Sister     Hearing loss Sister     Tremor Sister     Heart disease Brother         cardiomegaly    Seizures Brother     Obesity Brother     Diabetes Brother     Atrial fibrillation Brother     Stroke Brother     Heart attack Brother     Hypertension Brother     Anxiety disorder Brother     Heart failure Brother     Vision loss Brother     COPD Sister     Osteoarthritis Sister     Cancer Sister         cancerous tumor on spine    Constipation Sister         chronic    Fibroids Sister     Breast cancer Sister     Cancer Brother         melanoma on ankle, adrenal carcenoma     Atrial fibrillation Brother     Hypertension Brother     Heart disease Brother         endocarditis    Diabetes Brother     Hyperlipidemia Brother     Adrenal disorder Brother         adrenal carcenoma    Cancer Mother         lung    Schizophrenia Brother     Hypertension Brother     Obesity Brother     Hernia Brother         gential hernia    Cancer Brother         testicle    Depression Brother     Hearing loss Brother         hole in right ear drum    Sleep apnea Brother     Lung disease Brother     Colon polyps Brother         small portion of lower colon removed    Thyroiditis Brother         thyroglossal cyst     [id number]       Delivery Providers    Delivering clinician: Jocelyn Muñoz MD   Provider Role     Delivery Nurse     Nursery Nurse     Resident               Decision for : Patient pushing for close to 3 hours with good maternal effort and with minimal descent.  Arrest of descent called and patient amenable to .  R/B/A explained.    Pre op diagnosis:      1. IUP at 39 weeks   2. Arrest of descent    Post op diagnosis: Same    Findings: Normal appearing gravid uterus, fallopian tubes, and ovaries. Amniotic fluid clear, female infant in cephalic presentation    Procedure: Pt was taken to the OR where combined epidural anesthesia was appropriately dosed.  She was then placed in the dorsal supine position with a left lateral tilt. A cruz catheter was placed, SCD's were applied, and the fetal head was destationed prior to a vaginal prep that was performed. A pre-procedure time out was performed.  The pt was given prophylactic dose of IV antibiotics.  She was then prepped and draped in the usual sterile fashion. A Pfannenstiel skin incision was made with the scalpel through the skin and subcutaneous fat to the underlying fascial layer.  The fascia was incised in the midline with the scalpel and the incision was extended bilaterally. The superior aspect of the incision was grasped, tented up with Kocher clamps and the rectus muscle was dissected off. The muscles were divided in the midline, the peritoneum was then identified and entered bluntly and incision extended superiorly and inferiorly taking care to avoid underlying viscera.  The bladder blade was inserted.       A low transverse uterine incision was made with the scalpel, the uterine cavity was entered, and the hysterotomy was extended bilaterally with cephalocaudal stretch.  The infant was delivered atraumatically, the cord was clamped and cut and infant was handed off to awaiting nursing.  A cord blood sample was collected.  The placenta was  Hiatal hernia Brother     Vision loss Brother     Cancer Brother         adenocarcinoma     Heart disease Brother         cardiomegaly    Obesity Brother     Tremor Brother     Diabetes Brother     Seizures Brother     Depression Brother     Heart disease Brother     Hyperlipidemia Brother     Color blindness Brother     Mental retardation Brother     Hypertension Brother     Stroke Brother     Kidney disease Brother     Vision loss Brother     Narcolepsy Paternal Uncle     Ovarian cancer Neg Hx     Colon cancer Neg Hx        Social History     Socioeconomic History    Marital status: Single   Tobacco Use    Smoking status: Never    Smokeless tobacco: Never   Substance and Sexual Activity    Alcohol use: No    Drug use: No    Sexual activity: Not Currently     Partners: Male     Social Determinants of Health     Financial Resource Strain: Low Risk     Difficulty of Paying Living Expenses: Not hard at all   Food Insecurity: No Food Insecurity    Worried About Running Out of Food in the Last Year: Never true    Ran Out of Food in the Last Year: Never true   Transportation Needs: No Transportation Needs    Lack of Transportation (Medical): No    Lack of Transportation (Non-Medical): No   Physical Activity: Insufficiently Active    Days of Exercise per Week: 2 days    Minutes of Exercise per Session: 60 min   Stress: No Stress Concern Present    Feeling of Stress : Not at all   Social Connections: Moderately Isolated    Frequency of Communication with Friends and Family: Three times a week    Frequency of Social Gatherings with Friends and Family: More than three times a week    Attends [employer] Services: 1 to 4 times per year    Active Member of Clubs or Organizations: No    Attends Club or Organization Meetings: Never    Marital Status: Never    Housing Stability: Low Risk     Unable to Pay for Housing in the Last Year: No    Number of Places Lived in the Last Year: 1    Unstable Housing in the Last Year: No  then expressed.  The uterus was exteriorized and cleared of all clot and debris. The uterine incision was repaired using a running locked stitch of 0-Vicryl. A second layer of the same suture was placed in an imbricating fashion.  At this time it was noted that there was oozing at the left lower portion of the incision where an additional figure of eight suture was placed.  On the right side an additional suture of figure of eight was placed for additional bleeding. Additional electrocautery was used to achieve hemostasis on the uterus.  During the case the uterus was atonic and so hemabate was given, intrauterine Pitocin was given and rectal cytotec was given.  A B-Loyola suture was placed as the uterus was still quite atonic at this time.  The uterus was the placed back inside the pelvis, and Dr. Ya joined the case and helped assist with a Manju placed from below as there was still persistent bleeding from the uterus.  Manju balloon inflated to 180 ml of saline and placed on suction.  Tone was quite improved and bleeding into tubing was minimal.  Good hemostasis was noted     The gutters were cleared of all clots and debris.  The hysterotomy was again evaluated and found to be hemostatic, the underside of the fascia and bladder and the rectus muscles were also found to be hemostatic.  The fascia was closed using a running stitch of 0-PDS.  The subcutaneous layer was irrigated, small bleeders were cauterized, and the subcutaneous layer was reapproximated using a running stitch of 2-0 Monocryl. The skin was closed with 4-0 Vicryl.  All counts were correct, the patient tolerated the procedure well.   The patient and infant were taken back to the recovery room in stable condition.    Jocelyn Muñoz MD  Obstetrics and Gynecology         Past Surgical History:   Procedure Laterality Date    BREAST BIOPSY Bilateral     Benign    BREAST CYST EXCISION Bilateral     CATARACT EXTRACTION Bilateral     CATARACT EXTRACTION W/ INTRAOCULAR LENS IMPLANT      CATHETERIZATION OF BOTH LEFT AND RIGHT HEART N/A 1/3/2023    Procedure: CATHETERIZATION, HEART, BOTH LEFT AND RIGHT;  Surgeon: Anamika South MD;  Location: [facility] [place] CATH LAB;  Service: Cardiology;  Laterality: N/A;  resched from 12/20    CERVICAL BIOPSY      CHOLECYSTECTOMY      ERCP N/A 12/29/2018    Procedure: ERCP (ENDOSCOPIC RETROGRADE CHOLANGIOPANCREATOGRAPHY);  Surgeon: Gerry Schwartz MD;  [facility]  Service: Endoscopy;  Laterality: N/A;    ERCP N/A 2/5/2019    Procedure: ERCP (ENDOSCOPIC RETROGRADE CHOLANGIOPANCREATOGRAPHY);  Surgeon: Benji Gomez MD;  Location: [facility];  Service: Endoscopy;  Laterality: N/A;    INJECTION OF ANESTHETIC AGENT AROUND NERVE N/A 2/22/2022    Procedure: BLOCK, NERVE;  Surgeon: Chandu Osorio MD;  Location: [facility];  Service: Neurosurgery;  Laterality: N/A;  Erector Spinae Plane Nerve Block    INJECTION OF ANESTHETIC AGENT INTO SACROILIAC JOINT Bilateral 6/29/2022    Procedure: Bilateral Sacroiliac Joint Injection with RN IV sedation;  Surgeon: Madison Foreman MD;  Location: [facility] PAIN MGT;  Service: Pain Management;  Laterality: Bilateral;    INJECTION OF JOINT Bilateral 6/29/2022    Procedure: Bilateral GT bursa injection with RN IV sedation;  Surgeon: Madison Foreman MD;  Location: [facility] PAIN MGT;  Service: Pain Management;  Laterality: Bilateral;    INJECTION OF JOINT Bilateral 11/2/2022    Procedure: Bilateral GT bursa + bilateral SIJ injection RN IV Sedation;  Surgeon: Madison Foreman MD;  Location: [facility] PAIN MGT;  Service: Pain Management;  Laterality: Bilateral;    LAPAROSCOPIC CHOLECYSTECTOMY N/A 1/2/2019    Procedure: CHOLECYSTECTOMY, LAPAROSCOPIC;  Surgeon: Cb Cox MD;  Location: [address];  Service: General;   "Laterality: N/A;    optic stent Bilateral 10/24/2017    iStent 10/24/17    VERTEBROPLASTY N/A 2/22/2022    Procedure: Vertebroplasty;  Surgeon: Chandu Osorio MD;  Location: [facility] OR;  Service: Neurosurgery;  Laterality: N/A;  L1       Review of Systems   Constitutional:  Positive for activity change and fatigue. Negative for appetite change.   HENT:  Negative for dental problem, nosebleeds and sore throat.    Eyes:  Negative for discharge and visual disturbance.   Respiratory:  Positive for shortness of breath. Negative for cough, chest tightness and stridor.    Cardiovascular:  Negative for leg swelling.   Gastrointestinal:  Negative for abdominal distention and abdominal pain.   Genitourinary:  Negative for difficulty urinating and dysuria.   Musculoskeletal:  Positive for arthralgias. Negative for back pain and joint swelling.   Allergic/Immunologic: Negative for environmental allergies.   Neurological:  Negative for dizziness, syncope and headaches.   Hematological:  Does not bruise/bleed easily.   Psychiatric/Behavioral:  Negative for behavioral problems.         Objective:   /88   Pulse 79   Ht 5' 3" (1.6 m)   Wt 105.5 kg (232 lb 9.4 oz)   LMP  (LMP Unknown) Comment: post menopause  SpO2 96%   BMI 41.20 kg/m²     Cardiac catheterization    The pre-procedure left ventricular end diastolic pressure was 35.    The estimated blood loss was none.    There was non-obstructive coronary artery disease..    There was diastolic dysfunction.    There was severe aortic valve stenosis.    The procedure log was documented by Documenter: Ashok Schwartz RN and   verified by Alina South MD.    Date: 1/3/2023  Time: 12:37 PM         Physical Exam  Vitals and nursing note reviewed.   Constitutional:       Appearance: She is obese.   HENT:      Head: Normocephalic and atraumatic.      Mouth/Throat:      Mouth: Mucous membranes are moist.   Eyes:      Extraocular Movements: Extraocular movements intact.      " Pupils: Pupils are equal, round, and reactive to light.   Cardiovascular:      Rate and Rhythm: Normal rate and regular rhythm.      Pulses: Normal pulses.      Heart sounds: Murmur heard.   Pulmonary:      Effort: Pulmonary effort is normal.      Breath sounds: Normal breath sounds.   Abdominal:      Palpations: Abdomen is soft.   Musculoskeletal:         General: Normal range of motion.      Cervical back: Normal range of motion and neck supple.   Skin:     General: Skin is warm.      Capillary Refill: Capillary refill takes less than 2 seconds.   Neurological:      General: No focal deficit present.      Mental Status: She is alert and oriented to person, place, and time.   Psychiatric:         Mood and Affect: Mood normal.       Risk of Mortality:  1.869%  Renal Failure:  1.575%  Permanent Stroke:  0.953%  Prolonged Ventilation:  8.695%  DSW Infection:  0.175%  Reoperation:  1.940%  Morbidity or Mortality:  10.753%  Short Length of Stay:  35.472%  Long Length of Stay:  5.370%    Assessment:     1. Stenosis of aortic and mitral valves    Hypertension   Hyperlipidemia  Obesity  Type 2 diabetes mellitus  Paroxysmal atrial fibrillation    Plan   We will continue workup for surgical aortic valve replacement versus transcatheter aortic valve replacement.  The patient fall in the low risk category for surgical aortic valve replacement.  But after clinical assessment I I feel that she can be also a candidate for transcatheter aortic valve replacement.  We will proceed with the CT angiogram TAVR protocol to assess the access vessels as well as the choice of valve.  Had a detailed discussion with the patient about the charges and she has a clear understanding.          Erick Louis MD,   [facility]  [place]

## 2023-12-11 LAB
ABO GROUP (TYPE) IN BLOOD: NORMAL
ANTIBODY SCREEN: NORMAL
BLOOD EXPIRATION DATE: NORMAL
DISPENSE STATUS: NORMAL
ERYTHROCYTE [DISTWIDTH] IN BLOOD BY AUTOMATED COUNT: 13.7 % (ref 11.5–14.5)
HCT VFR BLD AUTO: 27.6 % (ref 36–46)
HGB BLD-MCNC: 9.2 G/DL (ref 12–16)
MCH RBC QN AUTO: 29.6 PG (ref 26–34)
MCHC RBC AUTO-ENTMCNC: 33.3 G/DL (ref 32–36)
MCV RBC AUTO: 89 FL (ref 80–100)
NRBC BLD-RTO: 0 /100 WBCS (ref 0–0)
PLATELET # BLD AUTO: 144 X10*3/UL (ref 150–450)
PRODUCT BLOOD TYPE: 5100
PRODUCT CODE: NORMAL
RBC # BLD AUTO: 3.11 X10*6/UL (ref 4–5.2)
RH FACTOR (ANTIGEN D): NORMAL
UNIT ABO: NORMAL
UNIT NUMBER: NORMAL
UNIT RH: NORMAL
UNIT VOLUME: 400
WBC # BLD AUTO: 15.9 X10*3/UL (ref 4.4–11.3)
XM INTEP: NORMAL

## 2023-12-11 PROCEDURE — 2500000001 HC RX 250 WO HCPCS SELF ADMINISTERED DRUGS (ALT 637 FOR MEDICARE OP)

## 2023-12-11 PROCEDURE — 2500000004 HC RX 250 GENERAL PHARMACY W/ HCPCS (ALT 636 FOR OP/ED)

## 2023-12-11 PROCEDURE — 1220000001 HC OB SEMI-PRIVATE ROOM DAILY

## 2023-12-11 PROCEDURE — 36415 COLL VENOUS BLD VENIPUNCTURE: CPT

## 2023-12-11 PROCEDURE — 1210000001 HC SEMI-PRIVATE ROOM DAILY

## 2023-12-11 PROCEDURE — 85027 COMPLETE CBC AUTOMATED: CPT

## 2023-12-11 PROCEDURE — 86900 BLOOD TYPING SEROLOGIC ABO: CPT

## 2023-12-11 PROCEDURE — 96372 THER/PROPH/DIAG INJ SC/IM: CPT

## 2023-12-11 RX ADMIN — ENOXAPARIN SODIUM 40 MG: 100 INJECTION SUBCUTANEOUS at 10:39

## 2023-12-11 RX ADMIN — OXYCODONE HYDROCHLORIDE 5 MG: 5 TABLET ORAL at 04:14

## 2023-12-11 RX ADMIN — ACETAMINOPHEN 975 MG: 325 TABLET ORAL at 16:17

## 2023-12-11 RX ADMIN — OXYCODONE HYDROCHLORIDE 5 MG: 5 TABLET ORAL at 16:22

## 2023-12-11 RX ADMIN — SIMETHICONE 80 MG: 80 TABLET, CHEWABLE ORAL at 16:22

## 2023-12-11 RX ADMIN — KETOROLAC TROMETHAMINE 30 MG: 30 INJECTION, SOLUTION INTRAMUSCULAR; INTRAVENOUS at 04:03

## 2023-12-11 RX ADMIN — OXYCODONE HYDROCHLORIDE 5 MG: 5 TABLET ORAL at 22:56

## 2023-12-11 RX ADMIN — ACETAMINOPHEN 975 MG: 325 TABLET ORAL at 04:03

## 2023-12-11 RX ADMIN — IBUPROFEN 600 MG: 600 TABLET, FILM COATED ORAL at 22:55

## 2023-12-11 RX ADMIN — ACETAMINOPHEN 975 MG: 325 TABLET ORAL at 22:55

## 2023-12-11 RX ADMIN — IBUPROFEN 600 MG: 600 TABLET, FILM COATED ORAL at 16:16

## 2023-12-11 RX ADMIN — KETOROLAC TROMETHAMINE 30 MG: 30 INJECTION, SOLUTION INTRAMUSCULAR; INTRAVENOUS at 10:31

## 2023-12-11 RX ADMIN — ACETAMINOPHEN 975 MG: 325 TABLET ORAL at 10:31

## 2023-12-11 ASSESSMENT — PAIN SCALES - GENERAL
PAINLEVEL_OUTOF10: 8
PAINLEVEL_OUTOF10: 8
PAINLEVEL_OUTOF10: 4
PAINLEVEL_OUTOF10: 6
PAINLEVEL_OUTOF10: 6
PAINLEVEL_OUTOF10: 3
PAINLEVEL_OUTOF10: 6
PAINLEVEL_OUTOF10: 7

## 2023-12-11 ASSESSMENT — PAIN DESCRIPTION - DESCRIPTORS
DESCRIPTORS: ACHING;SORE
DESCRIPTORS: ACHING;SORE

## 2023-12-11 NOTE — PROGRESS NOTES
Postpartum Progress Note    Assessment/Plan   Dary Costa is a 23 y.o., , who delivered at 39w4d gestation and is now postpartum day 1.    s/p LTCS on POD1  - continue routine postoperative care  - pain well controlled on PO medications  - Hgb: acute blood loss anemia, asymptomatic, PO iron supplement on DC  Results from last 7 days   Lab Units 23  0958 12/10/23  0941 23  2203   HEMOGLOBIN g/dL 9.2* 11.3* 11.3*     - DVT Score: 6, SCDs and enoxaparin prophylactic dose daily while inpatient  - - Increased gas pain/abdominal distension; encouraged ambulation and PO intake as tolerated, chewing gum, and simethicone/Milk of Magnesia and/or laxative.    PEC without SF  - Dx by 2 mild range pressures >4hrs apart in labor  - HELLP labs negative x2; P:C 0.35  - BP cuff for home  - Reviewed sx/signs elevated BP   - Normotensive to low mild range, asymptomatic   - Continue to monitor     Maternal Well-Being  - emotional support provided  - bonding with infant in NICU    [place] Feeding  - breastfeeding/pumping encouraged; lactation consult prn     Contraception  - undecided    Dispo  - Anticipate DC PPD3 pending BP control.  - The signs and symptoms of PEC were reviewed with the patient, including unrelenting headache, vision changes/blurred vision, and pain underneath the right breast.   - BP cuff for home for checking BP BID. Pt instructed to call primary OB if SBP > 160 or DBP > 110.  - On discharge, follow up with primary OB in 2-5 days for BP check, 2 weeks for incision check, and 4-6 weeks for postpartum visit.     Queta Dolan PA-C     Principal Problem:    39 weeks gestation of pregnancy  Active Problems:    Pre-eclampsia in third trimester    Hypokalemia    Pregnancy Problems (from 23 to present)       Problem Noted Resolved    Pre-eclampsia in third trimester 2023 by Lubna Zabala MD No    Priority:  Medium      39 weeks gestation of pregnancy 2023 by Lubna Zabala,  MD No    Priority:  Medium      Encounter for supervision of normal first pregnancy in third trimester 10/24/2023 by ALEJA Murphy No    Priority:  Medium      Overview Signed 10/24/2023  6:08 PM by ALEJA Murphy     Covid vaccinated, declines booster  Declines Flu  [facility] course: PEC without SF   section delivery  Patient is currently breastfeedingThe patient's blood type is O POS. The baby's blood type is O POS . Rhogam is not indicated.    Subjective   Her pain is well controlled with current medications  She is passing flatus  She is ambulating well  She is tolerating a Adult diet Regular  She reports no breast or nursing problems  She denies emotional concerns today   Her plan for contraception is TBD     Patient is seen in NICU. She states she has 8/10 pressure/pain at incision and abdomen which improved with oxycodone. We discussed different options for gas/distension. She notes this pain makes it difficult to ambulate when it is at its worst. She is voiding spontaneously and notes light lochia. She Denies HA, SOB, RUQ pain, vision changes; denies dizziness/lightheadedness/LOC/SOB/uncontrolled bleeding.       Objective   Allergies:   Patient has no known allergies.         Last Vitals:  Temp Pulse Resp BP MAP Pulse Ox   36.7 °C (98.1 °F) 87 18 116/70   97 %     Vitals Min/Max Last 24 Hours:  Temp  Min: 36.4 °C (97.5 °F)  Max: 36.8 °C (98.2 °F)  Pulse  Min: 84  Max: 108  Resp  Min: 16  Max: 18  BP  Min: 106/69  Max: 128/85    Intake/Output:     Intake/Output Summary (Last 24 hours) at 2023 1431  Last data filed at 2023 0638  Gross per 24 hour   Intake --   Output 1200 ml   Net -1200 ml       Physical Exam:  General: well appearing, well nourished, postpartum  Obstetric: fundus firm below umbilicus, lochia light  Skin: Warm, dry; no rashes/lesions/erythema; LTCS incision covered with long-term bandage without shadowing.   Breast: No  masses, nipple discharge  Neuro: A/Ox3, conversational, no gross motor deficit   GI: mild distension, appropriately tender, soft  Respiratory: Even and unlabored on RA  Cardiovascular: Trace BLE edema; No erythema, warmth  Psych: appropriate mood and affect      Lab Data:  Labs in chart were reviewed.

## 2023-12-11 NOTE — LACTATION NOTE
This note was copied from a baby's chart.  Lactation Consultant Note  Lactation Consultation   Maternal-Infant separation r/t NICU admission.    Maternal Information       Maternal Assessment       Infant Assessment       Feeding Assessment       LATCH TOOL       Breast Pump   Mom has electric breast pump for home use.    Other OB Lactation Tools       Patient Follow-up       Other OB Lactation Documentation       Recommendations/Summary  Met with parents. Explained availability of [facility] LC services. Instructed on listed patient education, JAYNE, Benefits of mother's own milk for infant, breast massage & hand expression, CDC pump cleaning & sanitizing guidelines.  Invited to contact LC services as needed. Encourage to provide consistent breastfeeding opportunities.

## 2023-12-11 NOTE — ANESTHESIA POSTPROCEDURE EVALUATION
Patient: Dary Costa    Procedure Summary       Date: 23 Room / Location: [facility]    Anesthesia Start: 541 Anesthesia Stop: 12/10/23 1032    Procedure:  Section (Abdomen) Diagnosis:     Surgeons: Chito Ya MD Responsible Provider: KRYSTINA Raymond    Anesthesia Type: epidural ASA Status: 2            Anesthesia Type: epidural      Anesthesia Post Evaluation    Patient location during evaluation: bedside  Patient participation: complete - patient participated  Level of consciousness: awake  Pain management: adequate  Multimodal analgesia pain management approach  Airway patency: patent  Cardiovascular status: acceptable  Respiratory status: acceptable  Hydration status: acceptable  Postoperative Nausea and Vomiting: none  Comments: Dary Costa is a 23 y.o., , who had a , Low Transverse delivery on 12/10/2023 at 39w4d and is now POD1.  performed at OSH.    She had Neuraxial Anesthesia without immediate complications noted.       Pain well controlled    ---------------------------               23                      0403         ---------------------------   BP:          106/69         Pulse:         89           Resp:          16           Temp:   36.4 °C (97.5 °F)   SpO2:          97%         ---------------------------    Neuraxial site assessed. No visible redness or swelling or drainage. Patient able to ambulate and move all extremities without difficulty. Able to void. No complaints of nausea/vomiting. Tolerating PO intake well. No s/sx of PDPH.     Anesthesia will sign off     Gerber Kuhn MD          There were no known notable events for this encounter.

## 2023-12-11 NOTE — SIGNIFICANT EVENT
Patient meets criteria for home monitoring of blood pressure post discharge.  Met with patient to assess for availability of home BP monitor.  Patient does not have access to BP monitor at home.  Pt agreed to order home BP monitor from [employer]. Large BP monitor delivered to room.  Patient educated on how to use BP monitor, recording BP's on home monitoring log and s/sx to report to her provider.  Pt verbalized understanding the above information.

## 2023-12-11 NOTE — H&P
Obstetrical Admission History and Physical     Dary Costa is a 23 y.o. . Presenting as postpartum transfer for NICU admission.    Chief Complaint: PP Transfer    Assessment/Plan    23 y.o. is a 23 y.o.  presenting as postpartum transfer for NICU admission who is POD0 from a primary LTCS for arrest of descent.    Now POD#0 s/p pLTCS on 12/10  - Continue routine postpartum care  - Pain controlled on po medications  - DVT risk score  6, for ppx w/ lovenox on POD1  - Rh: pos, no indication for rhogam  - Hgb: 11.3, pending AM CBC    PEC w/o SF  - dx'd in labor based on MRBPs and P:C 0.35  - HELLP labs neg x2 at OSH  - No meds  - Normotensive to mild range, continue to monitor  - Asx, no S&S of severe features on admission exam    Maternal Well-Being  - Emotional support provided     Feeding  - Breastfeeding/pumping encouraged; lactation consult prn    Contraception  - Education provided  - PPBC: to be addressed    Dispo  - Anticipate d/c on POD #2-3 if meeting all postpartum milestones  - F/u 7 days for incision check and in 1month with Primary OB provider    D/w Dr. Thomas.  Abigail Martin MD PGY-1 OB/GYN   Postpartum Pager [phone]     Principal Problem:    39 weeks gestation of pregnancy  Active Problems:    Pre-eclampsia in third trimester    Hypokalemia      Pregnancy Problems (from 22 to present)       Problem Noted Resolved    Pre-eclampsia in third trimester 2023 by Lubna Zabala MD No    Priority:  Medium      39 weeks gestation of pregnancy 2023 by Lubna Zabala MD No    Priority:  Medium      Encounter for supervision of normal first pregnancy in third trimester 10/24/2023 by ALEJA Murphy No    Priority:  Medium      Overview Signed 10/24/2023  6:08 PM by ALEJA Murphy     Covid vaccinated, declines booster  Declines Flu  rrNIPS                 Subjective   Seen at bedside in NAD.     Obstetrical History   OB History     Para Term  AB Living   1 1 1     1   SAB IAB Ectopic Multiple Live Births         0 1      # Outcome Date GA Lbr Surinder/2nd Weight Sex Delivery Anes PTL Lv   1 Term 12/10/23 39w4d / 05:45 4180 g F CS-LTranv EPI  STEPHY      Complications: Uterine Atony, Hemorrhage, Arrest of descent, delivered, current hospitalization       Past Medical History  Past Medical History:   Diagnosis Date    Allergic rhinitis 2023    H/O left wrist surgery     Impacted cerumen of right ear 2023    Irritable bowel syndrome (IBS) 2023    Pre-eclampsia in third trimester 2023    Sore throat 2023    TIA (transient ischemic attack)     Sixes teeth extracted         Past Surgical History   Past Surgical History:   Procedure Laterality Date    MR HEAD ANGIO WO IV CONTRAST  2021    MR HEAD ANGIO WO IV CONTRAST 2021 [facility]    MR NECK ANGIO WO IV CONTRAST  2021    MR NECK ANGIO WO IV CONTRAST 2021 [facility]    OTHER SURGICAL HISTORY  2020    Wrist surgery       Social History  Social History     Tobacco Use    Smoking status: Never    Smokeless tobacco: Never   Substance Use Topics    Alcohol use: Not Currently     Substance and Sexual Activity   Drug Use Never       Allergies  Patient has no known allergies.     Medications  Medications Prior to Admission   Medication Sig Dispense Refill Last Dose    acetaminophen (Tylenol) 500 mg tablet Take 1 tablet (500 mg) by mouth every 6 hours if needed for mild pain (1 - 3).   Past Week    ferrous sulfate 325 (65 Fe) MG EC tablet Take 65 mg by mouth 3 times a day with meals. Do not crush, chew, or split.   Past Week    ondansetron (Zofran) 4 mg tablet Take 1 tablet (4 mg) by mouth every 8 hours if needed for nausea or vomiting. 20 tablet 2 2023       Objective    Last Vitals  Temp Pulse Resp BP MAP O2 Sat   36.5 °C (97.7 °F) 88 18 128/85   97 %     Physical Examination  General: no acute distress  HEENT:  normocephalic, atraumatic  Heart: warm and well perfused  Lungs: breathing comfortably on room air  Abdomen: fundus firm below umbilicus  Extremities: moving all extremities spontaneously  Neuro: awake and conversant  Psych: appropriate mood and affect     Lab Review  Lab Results   Component Value Date    WBC 24.2 (H) 12/10/2023    HGB 11.3 (L) 12/10/2023    HCT 35.4 (L) 12/10/2023     12/10/2023     Lab Results   Component Value Date    GLUCOSE 85 12/10/2023     12/10/2023    K 3.7 12/10/2023     12/10/2023    CO2 16 (L) 12/10/2023    ANIONGAP 20 12/10/2023    BUN 6 12/10/2023    CREATININE 0.99 12/10/2023    EGFR 82 12/10/2023    CALCIUM 8.7 12/10/2023    ALBUMIN 3.8 12/10/2023    PROT 6.7 12/10/2023    ALKPHOS 168 (H) 12/10/2023    ALT 6 (L) 12/10/2023    AST 18 12/10/2023    BILITOT 0.6 12/10/2023

## 2023-12-11 NOTE — CARE PLAN
The patient's goals for the shift include bond w/ baby, pain control    The clinical goals for the shift include meet postpartum milestones    VS stable overnight. No s/sx HDP. Pain control progressing w/ scheduled Tylenol, Toradol, and PRN oxycodone. Meeting postpartum milestones: ambulating to NICU, bonding w/ infant, voiding, tolerating PO. Incision clean, dry, intact. Lochia and fundus appropriate.

## 2023-12-12 LAB
BLOOD EXPIRATION DATE: NORMAL
BLOOD EXPIRATION DATE: NORMAL
DISPENSE STATUS: NORMAL
DISPENSE STATUS: NORMAL
ERYTHROCYTE [DISTWIDTH] IN BLOOD BY AUTOMATED COUNT: 13.4 % (ref 11.5–14.5)
HCT VFR BLD AUTO: 23.8 % (ref 36–46)
HGB BLD-MCNC: 8.3 G/DL (ref 12–16)
MCH RBC QN AUTO: 29.5 PG (ref 26–34)
MCHC RBC AUTO-ENTMCNC: 34.9 G/DL (ref 32–36)
MCV RBC AUTO: 85 FL (ref 80–100)
NRBC BLD-RTO: 0 /100 WBCS (ref 0–0)
PLATELET # BLD AUTO: 223 X10*3/UL (ref 150–450)
PRODUCT BLOOD TYPE: 5100
PRODUCT BLOOD TYPE: 9500
PRODUCT CODE: NORMAL
PRODUCT CODE: NORMAL
RBC # BLD AUTO: 2.81 X10*6/UL (ref 4–5.2)
UNIT ABO: NORMAL
UNIT ABO: NORMAL
UNIT NUMBER: NORMAL
UNIT NUMBER: NORMAL
UNIT RH: NORMAL
UNIT RH: NORMAL
UNIT VOLUME: 350
UNIT VOLUME: 350
WBC # BLD AUTO: 14.6 X10*3/UL (ref 4.4–11.3)
XM INTEP: NORMAL
XM INTEP: NORMAL

## 2023-12-12 PROCEDURE — 96372 THER/PROPH/DIAG INJ SC/IM: CPT

## 2023-12-12 PROCEDURE — 1210000001 HC SEMI-PRIVATE ROOM DAILY

## 2023-12-12 PROCEDURE — 2500000004 HC RX 250 GENERAL PHARMACY W/ HCPCS (ALT 636 FOR OP/ED): Performed by: NURSE PRACTITIONER

## 2023-12-12 PROCEDURE — 85027 COMPLETE CBC AUTOMATED: CPT | Performed by: NURSE PRACTITIONER

## 2023-12-12 PROCEDURE — 2500000001 HC RX 250 WO HCPCS SELF ADMINISTERED DRUGS (ALT 637 FOR MEDICARE OP)

## 2023-12-12 PROCEDURE — 36415 COLL VENOUS BLD VENIPUNCTURE: CPT | Performed by: NURSE PRACTITIONER

## 2023-12-12 PROCEDURE — 2500000004 HC RX 250 GENERAL PHARMACY W/ HCPCS (ALT 636 FOR OP/ED)

## 2023-12-12 RX ADMIN — IBUPROFEN 600 MG: 600 TABLET, FILM COATED ORAL at 10:38

## 2023-12-12 RX ADMIN — IBUPROFEN 600 MG: 600 TABLET, FILM COATED ORAL at 04:06

## 2023-12-12 RX ADMIN — ACETAMINOPHEN 975 MG: 325 TABLET ORAL at 04:06

## 2023-12-12 RX ADMIN — ENOXAPARIN SODIUM 40 MG: 100 INJECTION SUBCUTANEOUS at 10:38

## 2023-12-12 RX ADMIN — IRON SUCROSE 300 MG: 20 INJECTION, SOLUTION INTRAVENOUS at 18:47

## 2023-12-12 RX ADMIN — IBUPROFEN 600 MG: 600 TABLET, FILM COATED ORAL at 16:41

## 2023-12-12 RX ADMIN — ACETAMINOPHEN 975 MG: 325 TABLET ORAL at 16:41

## 2023-12-12 RX ADMIN — ACETAMINOPHEN 975 MG: 325 TABLET ORAL at 10:38

## 2023-12-12 RX ADMIN — IBUPROFEN 600 MG: 600 TABLET, FILM COATED ORAL at 22:19

## 2023-12-12 RX ADMIN — OXYCODONE HYDROCHLORIDE 5 MG: 5 TABLET ORAL at 14:39

## 2023-12-12 RX ADMIN — POLYETHYLENE GLYCOL 3350 17 G: 17 POWDER, FOR SOLUTION ORAL at 02:01

## 2023-12-12 RX ADMIN — ACETAMINOPHEN 975 MG: 325 TABLET ORAL at 22:19

## 2023-12-12 ASSESSMENT — PAIN SCALES - GENERAL
PAINLEVEL_OUTOF10: 0 - NO PAIN
PAINLEVEL_OUTOF10: 0 - NO PAIN
PAINLEVEL_OUTOF10: 8
PAINLEVEL_OUTOF10: 6
PAINLEVEL_OUTOF10: 6

## 2023-12-12 NOTE — PROGRESS NOTES
Postpartum Progress Note    Assessment/Plan   Dary Costa is a 23 y.o., , who delivered at 39w4d gestation and is now postpartum day 2.    Postpartum Progress Note    Assessment/Plan     s/p LTCS on POD2  - continue routine postoperative care  - pain well controlled on PO medications  - Hgb: acute blood loss anemia, asymptomatic, PO iron supplement on DC  Results from last 7 days   Lab Units 23  0958 12/10/23  0941 23  2203   HEMOGLOBIN g/dL 9.2* 11.3* 11.3*     - DVT Score: 6, SCDs and enoxaparin prophylactic dose daily while inpatient  - - Increased gas pain/abdominal distension; encouraged ambulation and PO intake as tolerated, chewing gum, and simethicone/Milk of Magnesia and/or laxative.    PEC without SF  - Dx by 2 mild range pressures >4hrs apart in labor  - HELLP labs negative x2; P:C 0.35  - BP cuff for home  - Reviewed sx/signs elevated BP   - Normotensive to low mild range, asymptomatic   - Continue to monitor     Maternal Well-Being  - emotional support provided  - bonding with infant in NICU     Feeding  - breastfeeding/pumping encouraged; lactation consult prn     Contraception  - undecided    Dispo  - Anticipate DC PPD3 pending BP control.  - The signs and symptoms of PEC were reviewed with the patient, including unrelenting headache, vision changes/blurred vision, and pain underneath the right breast.   - BP cuff for home for checking BP BID. Pt instructed to call primary OB if SBP > 160 or DBP > 110.  - On discharge, follow up with primary OB in 2-5 days for BP check, 2 weeks for incision check, and 4-6 weeks for postpartum visit.     Queta Dolan PAEddieC     Principal Problem:    39 weeks gestation of pregnancy  Active Problems:    Pre-eclampsia in third trimester    Hypokalemia    Pregnancy Problems (from 23 to present)       Problem Noted Resolved    Pre-eclampsia in third trimester 2023 by Lubna Zabala MD No    Priority:  Medium      39 weeks  gestation of pregnancy 2023 by Lubna Zabala MD No    Priority:  Medium      Encounter for supervision of normal first pregnancy in third trimester 10/24/2023 by ALEJA Murphy No    Priority:  Medium      Overview Signed 10/24/2023  6:08 PM by ALEJA Murphy     Covid vaccinated, declines booster  Declines Flu  [facility]                 Hospital course: PEC without SF   section delivery  Patient is currently breastfeedingThe patient's blood type is O POS. The baby's blood type is O POS . Rhogam is not indicated.    Subjective   Her pain is well controlled with current medications  She is passing flatus  She is ambulating well  She is tolerating a Adult diet Regular  She reports no breast or nursing problems  She denies emotional concerns today   Her plan for contraception is TBD     Very pleasant.  No complaints.      Objective   Allergies:   Patient has no known allergies.         Last Vitals:  Temp Pulse Resp BP MAP Pulse Ox   36.7 °C (98.1 °F) 87 18 116/70   97 %     Vitals Min/Max Last 24 Hours:  Temp  Min: 36.4 °C (97.5 °F)  Max: 36.8 °C (98.2 °F)  Pulse  Min: 84  Max: 108  Resp  Min: 16  Max: 18  BP  Min: 106/69  Max: 128/85    Intake/Output:     Intake/Output Summary (Last 24 hours) at 2023 1431  Last data filed at 2023 0638  Gross per 24 hour   Intake --   Output 1200 ml   Net -1200 ml       Physical Exam:  General: well appearing, well nourished, postpartum  Obstetric: fundus firm below umbilicus, lochia light  Skin: Warm, dry; no rashes/lesions/erythema; LTCS incision covered with long-term bandage without shadowing.   Breast: No masses, nipple discharge  Neuro: A/Ox3, conversational, no gross motor deficit   GI: mild distension, appropriately tender, soft  Respiratory: Even and unlabored on RA  Cardiovascular: Trace BLE edema; No erythema, warmth  Psych: appropriate mood and affect      Lab Data:  Labs in chart were reviewed.    Principal Problem:     39 weeks gestation of pregnancy  Active Problems:    Pre-eclampsia in third trimester    Hypokalemia    Pregnancy Problems (from 23 to present)       Problem Noted Resolved    Pre-eclampsia in third trimester 2023 by Lubna Zabala MD No    Priority:  Medium      39 weeks gestation of pregnancy 2023 by Lubna Zabala MD No    Priority:  Medium      Encounter for supervision of normal first pregnancy in third trimester 10/24/2023 by ALEJA Murphy No    Priority:  Medium      Overview Signed 10/24/2023  6:08 PM by ALEJA Murphy     Covid vaccinated, declines booster  Declines Flu  [facility] course: no complications   section delivery  Patient is not breastfeedingThe patient's blood type is O POS. The baby's blood type is O POS . Rhogam is not indicated.    Subjective   Her pain is well controlled with current medications  She is passing flatus  She is ambulating well  She is tolerating a Adult diet Regular  She reports no breast or nursing problems  She denies emotional concerns today   Her plan for contraception is none       Objective   Allergies:   Patient has no known allergies.         Last Vitals:  Temp Pulse Resp BP MAP Pulse Ox   36.6 °C (97.9 °F) 75 16 116/76   95 %     Vitals Min/Max Last 24 Hours:  Temp  Min: 36 °C (96.8 °F)  Max: 36.6 °C (97.9 °F)  Pulse  Min: 75  Max: 88  Resp  Min: 16  Max: 18  BP  Min: 114/70  Max: 129/82    Intake/Output:   No intake or output data in the 24 hours ending 23 1208    Physical Exam:  General: Examination reveals a well developed, well nourished, female, in no acute distress. She is alert and cooperative.  Lungs: symmetrical, non-labored breathing.  Cardiac: warm, well-perfused.  Abdomen: soft, non-tender.  Fundus: firm, at umbilicus, and nontender.  Extremities: no redness or tenderness in the calves or thighs.  Neurological: alert, oriented, normal speech, no focal findings or  movement disorder noted.      Lab Data:  Lab Results   Component Value Date    WBC 15.9 (H) 12/11/2023    HGB 9.2 (L) 12/11/2023    HCT 27.6 (L) 12/11/2023     (L) 12/11/2023     Lab Results   Component Value Date    GLUCOSE 85 12/10/2023     12/10/2023    K 3.7 12/10/2023     12/10/2023    CO2 16 (L) 12/10/2023    ANIONGAP 20 12/10/2023    BUN 6 12/10/2023    CREATININE 0.99 12/10/2023    EGFR 82 12/10/2023    CALCIUM 8.7 12/10/2023    ALBUMIN 3.8 12/10/2023    PROT 6.7 12/10/2023    ALKPHOS 168 (H) 12/10/2023    ALT 6 (L) 12/10/2023    AST 18 12/10/2023    BILITOT 0.6 12/10/2023

## 2023-12-13 ENCOUNTER — APPOINTMENT (OUTPATIENT)
Dept: OBSTETRICS AND GYNECOLOGY | Facility: CLINIC | Age: 23
End: 2023-12-13
Payer: COMMERCIAL

## 2023-12-13 PROBLEM — O14.90 PRE-ECLAMPSIA, ANTEPARTUM (HHS-HCC): Status: ACTIVE | Noted: 2023-12-09

## 2023-12-13 PROBLEM — Z3A.39 39 WEEKS GESTATION OF PREGNANCY (HHS-HCC): Status: RESOLVED | Noted: 2023-12-08 | Resolved: 2023-12-13

## 2023-12-13 PROBLEM — Z34.03 ENCOUNTER FOR SUPERVISION OF NORMAL FIRST PREGNANCY IN THIRD TRIMESTER (HHS-HCC): Status: RESOLVED | Noted: 2023-10-24 | Resolved: 2023-12-13

## 2023-12-13 LAB
ERYTHROCYTE [DISTWIDTH] IN BLOOD BY AUTOMATED COUNT: 13.6 % (ref 11.5–14.5)
HCT VFR BLD AUTO: 25.1 % (ref 36–46)
HGB BLD-MCNC: 8.4 G/DL (ref 12–16)
MCH RBC QN AUTO: 30.1 PG (ref 26–34)
MCHC RBC AUTO-ENTMCNC: 33.5 G/DL (ref 32–36)
MCV RBC AUTO: 90 FL (ref 80–100)
NRBC BLD-RTO: 0 /100 WBCS (ref 0–0)
PLATELET # BLD AUTO: 256 X10*3/UL (ref 150–450)
RBC # BLD AUTO: 2.79 X10*6/UL (ref 4–5.2)
WBC # BLD AUTO: 11.1 X10*3/UL (ref 4.4–11.3)

## 2023-12-13 PROCEDURE — 99231 SBSQ HOSP IP/OBS SF/LOW 25: CPT | Performed by: NURSE PRACTITIONER

## 2023-12-13 PROCEDURE — 2500000001 HC RX 250 WO HCPCS SELF ADMINISTERED DRUGS (ALT 637 FOR MEDICARE OP)

## 2023-12-13 PROCEDURE — 2500000004 HC RX 250 GENERAL PHARMACY W/ HCPCS (ALT 636 FOR OP/ED): Performed by: NURSE PRACTITIONER

## 2023-12-13 PROCEDURE — 85027 COMPLETE CBC AUTOMATED: CPT | Performed by: NURSE PRACTITIONER

## 2023-12-13 PROCEDURE — 36415 COLL VENOUS BLD VENIPUNCTURE: CPT | Performed by: NURSE PRACTITIONER

## 2023-12-13 PROCEDURE — 96372 THER/PROPH/DIAG INJ SC/IM: CPT

## 2023-12-13 PROCEDURE — 2500000004 HC RX 250 GENERAL PHARMACY W/ HCPCS (ALT 636 FOR OP/ED)

## 2023-12-13 PROCEDURE — 2500000001 HC RX 250 WO HCPCS SELF ADMINISTERED DRUGS (ALT 637 FOR MEDICARE OP): Performed by: NURSE PRACTITIONER

## 2023-12-13 PROCEDURE — 1210000001 HC SEMI-PRIVATE ROOM DAILY

## 2023-12-13 RX ORDER — LABETALOL 200 MG/1
200 TABLET, FILM COATED ORAL EVERY 8 HOURS
Status: DISCONTINUED | OUTPATIENT
Start: 2023-12-13 | End: 2023-12-14

## 2023-12-13 RX ADMIN — LABETALOL HYDROCHLORIDE 200 MG: 100 TABLET, FILM COATED ORAL at 10:43

## 2023-12-13 RX ADMIN — ACETAMINOPHEN 975 MG: 325 TABLET ORAL at 16:34

## 2023-12-13 RX ADMIN — ACETAMINOPHEN 975 MG: 325 TABLET ORAL at 21:53

## 2023-12-13 RX ADMIN — IBUPROFEN 600 MG: 600 TABLET, FILM COATED ORAL at 04:17

## 2023-12-13 RX ADMIN — ACETAMINOPHEN 975 MG: 325 TABLET ORAL at 04:18

## 2023-12-13 RX ADMIN — IBUPROFEN 600 MG: 600 TABLET, FILM COATED ORAL at 10:44

## 2023-12-13 RX ADMIN — IBUPROFEN 600 MG: 600 TABLET, FILM COATED ORAL at 21:53

## 2023-12-13 RX ADMIN — POLYETHYLENE GLYCOL 3350 17 G: 17 POWDER, FOR SOLUTION ORAL at 18:41

## 2023-12-13 RX ADMIN — OXYCODONE HYDROCHLORIDE 10 MG: 5 TABLET ORAL at 04:17

## 2023-12-13 RX ADMIN — OXYCODONE HYDROCHLORIDE 5 MG: 5 TABLET ORAL at 16:36

## 2023-12-13 RX ADMIN — ENOXAPARIN SODIUM 40 MG: 100 INJECTION SUBCUTANEOUS at 10:45

## 2023-12-13 RX ADMIN — IRON SUCROSE 300 MG: 20 INJECTION, SOLUTION INTRAVENOUS at 19:56

## 2023-12-13 RX ADMIN — ACETAMINOPHEN 975 MG: 325 TABLET ORAL at 10:44

## 2023-12-13 RX ADMIN — LABETALOL HYDROCHLORIDE 200 MG: 100 TABLET, FILM COATED ORAL at 18:41

## 2023-12-13 RX ADMIN — IBUPROFEN 600 MG: 600 TABLET, FILM COATED ORAL at 16:35

## 2023-12-13 ASSESSMENT — PAIN SCALES - GENERAL
PAINLEVEL_OUTOF10: 5 - MODERATE PAIN
PAINLEVEL_OUTOF10: 6
PAINLEVEL_OUTOF10: 5 - MODERATE PAIN
PAINLEVEL_OUTOF10: 2
PAINLEVEL_OUTOF10: 7
PAINLEVEL_OUTOF10: 9

## 2023-12-13 ASSESSMENT — PAIN DESCRIPTION - DESCRIPTORS: DESCRIPTORS: CRAMPING

## 2023-12-13 ASSESSMENT — PAIN - FUNCTIONAL ASSESSMENT: PAIN_FUNCTIONAL_ASSESSMENT: 0-10

## 2023-12-13 ASSESSMENT — PAIN DESCRIPTION - LOCATION: LOCATION: ABDOMEN

## 2023-12-13 NOTE — PROGRESS NOTES
Postpartum Progress Note    Assessment/Plan     Dary Costa is a 23 y.o., , who initially presented for PP transfer for baby in NICU  She had a , Low Transverse  delivery on 12/10/2023  at 39w4d and is now POD3.    #PEC w/o SF  - diagnosed by 2 mild range BPs > 4 hours apart and PCR 0.35  - asymptomatic  - BP persistently mild range started on Labetalol 200 TID  - HELLP labs negative and P:C  0.35  - will continue to monitor   - Reviewed patient's risk factors: Primiparity  - BP cuff for home monitoring BID: Given cuff     #Acute blood loss anemia  - Hg   11.3 --> 9.2 -> POD2 8.3 -> POD3 8.4  - asymptomatic, VSS  - s/p 1x dose IV Fe; for repeat dose today and PO Fe on DC  - continue to monitor for s/sx anemia   - blurred spots and dizzy spells non-positional; unclear if r/t anemia or oxycodone use; pt to hold oxy and reassess for symptom improvement    #Post-op , Low Transverse   - continue routine postoperative care  - pain well controlled on ERAS protocol  - DVT Score: 6 SCDs and enoxaparin prophylactic dose daily while inpatient  - The patient's blood type is O POS. The baby's blood type is O POS . Rhogam is not indicated.    #Contraception  Undecided     #Maternal Well-Being  - emotional support provided  - bonding with infant  - [place] feeding: breastfeeding/pumping encouraged; lactation consult prn     #Dispo  - Anticipate DC PPD#4 pending BP control.  - The signs and symptoms of PEC were reviewed with the patient, including unrelenting headache, vision changes/blurred vision, and RUQ pain  - BP cuff for home for checking BP twice a day  - Pt instructed to call primary OB if SBP > 160 or DBP > 110 or if development of PEC symptoms   - On discharge, follow up with primary OB in:       - 2-5 days for BP check       - 2 weeks for incision check       - 4-6 week for post-partum visit     Principal Problem:    39 weeks gestation of pregnancy  Active Problems:    Pre-eclampsia in third  "trimester    Hypokalemia      Subjective   Her pain is well controlled with current medications  She is passing flatus  She is ambulating independently  She is tolerating a Adult diet Regular  She is voiding spontaneously  She reports no breast or nursing problems  She denies emotional concerns today     Pt endorses transient \"blurred spots\" that last for a few seconds and dizziness that lasts about a minute. It will come on randomly (not associated w position changes). I    Objective   Last Vitals:  Temp Pulse Resp BP MAP Pulse Ox   36.2 °C (97.2 °F) 59 20 (!) 142/87   99 %     Vitals Min/Max Last 24 Hours:  Temp  Min: 36.1 °C (97 °F)  Max: 36.7 °C (98.1 °F)  Pulse  Min: 57  Max: 80  Resp  Min: 16  Max: 20  BP  Min: 125/78  Max: 149/85    Intake/Output:   No intake or output data in the 24 hours ending 12/13/23 1115    Physical Exam:  General: well appearing, well nourished, postpartum  Obstetric: fundus firm below umbilicus, lochia light  Skin: Warm, dry; no rashes/lesions/erythema  Breast: No masses, nipple discharge  Neuro: A/Ox3, no gross motor deficit   GI: no distension, appropriately tender, soft, +BS  Respiratory: Even and unlabored on RA, LSCTA BL  Cardiovascular: Trace BLE edema; No erythema, warmth  Psych: appropriate mood and affect    Lab Data:  Lab Results   Component Value Date    WBC 11.1 12/13/2023    HGB 8.4 (L) 12/13/2023    HCT 25.1 (L) 12/13/2023     12/13/2023      "

## 2023-12-14 PROCEDURE — 2500000001 HC RX 250 WO HCPCS SELF ADMINISTERED DRUGS (ALT 637 FOR MEDICARE OP)

## 2023-12-14 PROCEDURE — 96372 THER/PROPH/DIAG INJ SC/IM: CPT

## 2023-12-14 PROCEDURE — 2500000001 HC RX 250 WO HCPCS SELF ADMINISTERED DRUGS (ALT 637 FOR MEDICARE OP): Performed by: NURSE PRACTITIONER

## 2023-12-14 PROCEDURE — 2500000004 HC RX 250 GENERAL PHARMACY W/ HCPCS (ALT 636 FOR OP/ED)

## 2023-12-14 PROCEDURE — 1210000001 HC SEMI-PRIVATE ROOM DAILY

## 2023-12-14 RX ORDER — LABETALOL 100 MG/1
200 TABLET, FILM COATED ORAL ONCE
Status: COMPLETED | OUTPATIENT
Start: 2023-12-14 | End: 2023-12-14

## 2023-12-14 RX ORDER — LABETALOL 200 MG/1
400 TABLET, FILM COATED ORAL 3 TIMES DAILY
Status: DISCONTINUED | OUTPATIENT
Start: 2023-12-14 | End: 2023-12-15

## 2023-12-14 RX ADMIN — IBUPROFEN 600 MG: 600 TABLET, FILM COATED ORAL at 16:10

## 2023-12-14 RX ADMIN — ENOXAPARIN SODIUM 40 MG: 100 INJECTION SUBCUTANEOUS at 10:09

## 2023-12-14 RX ADMIN — LABETALOL HYDROCHLORIDE 200 MG: 100 TABLET, FILM COATED ORAL at 10:10

## 2023-12-14 RX ADMIN — LABETALOL HYDROCHLORIDE 200 MG: 100 TABLET, FILM COATED ORAL at 13:06

## 2023-12-14 RX ADMIN — ACETAMINOPHEN 975 MG: 325 TABLET ORAL at 22:07

## 2023-12-14 RX ADMIN — POLYETHYLENE GLYCOL 3350 17 G: 17 POWDER, FOR SOLUTION ORAL at 10:10

## 2023-12-14 RX ADMIN — LABETALOL HYDROCHLORIDE 200 MG: 100 TABLET, FILM COATED ORAL at 02:16

## 2023-12-14 RX ADMIN — IBUPROFEN 600 MG: 600 TABLET, FILM COATED ORAL at 04:38

## 2023-12-14 RX ADMIN — ACETAMINOPHEN 975 MG: 325 TABLET ORAL at 04:38

## 2023-12-14 RX ADMIN — IBUPROFEN 600 MG: 600 TABLET, FILM COATED ORAL at 10:10

## 2023-12-14 RX ADMIN — ACETAMINOPHEN 975 MG: 325 TABLET ORAL at 10:10

## 2023-12-14 RX ADMIN — ACETAMINOPHEN 975 MG: 325 TABLET ORAL at 16:10

## 2023-12-14 RX ADMIN — LABETALOL HYDROCHLORIDE 400 MG: 200 TABLET, FILM COATED ORAL at 17:59

## 2023-12-14 RX ADMIN — OXYCODONE HYDROCHLORIDE 5 MG: 5 TABLET ORAL at 04:38

## 2023-12-14 RX ADMIN — IBUPROFEN 600 MG: 600 TABLET, FILM COATED ORAL at 22:07

## 2023-12-14 ASSESSMENT — PAIN SCALES - GENERAL
PAINLEVEL_OUTOF10: 8
PAINLEVEL_OUTOF10: 3
PAINLEVEL_OUTOF10: 5 - MODERATE PAIN
PAINLEVEL_OUTOF10: 5 - MODERATE PAIN
PAINLEVEL_OUTOF10: 2
PAINLEVEL_OUTOF10: 2
PAINLEVEL_OUTOF10: 3

## 2023-12-14 ASSESSMENT — PAIN DESCRIPTION - DESCRIPTORS: DESCRIPTORS: CRAMPING

## 2023-12-14 ASSESSMENT — PAIN DESCRIPTION - LOCATION: LOCATION: ABDOMEN

## 2023-12-14 NOTE — PROGRESS NOTES
Postpartum Progress Note    Assessment/Plan   Dary Costa is a 23 y.o., , who delivered at 39w4d gestation and is now postpartum day 4.    Postpartum Progress Note    Assessment/Plan   Dary Costa is a 23 y.o., , who delivered at 39w4d gestation and is now postpartum day 4.    Postpartum Progress Note    Assessment/Plan     s/p LTCS on POD4  - continue routine postoperative care  - pain well controlled on PO medications  - Hgb: acute blood loss anemia, asymptomatic, PO iron supplement on DC  Results from last 7 days   Lab Units 23  0958 12/10/23  0941 23  2203   HEMOGLOBIN g/dL 9.2* 11.3* 11.3*     - DVT Score: 6, SCDs and enoxaparin prophylactic dose daily while inpatient  - - Increased gas pain/abdominal distension; encouraged ambulation and PO intake as tolerated, chewing gum, and simethicone/Milk of Magnesia and/or laxative.    PEC without SF  - Dx by 2 mild range pressures >4hrs apart in labor  - HELLP labs negative x2; P:C 0.35  - started labetalol 200BID ()- >now 400TID  - BP cuff for home  - Reviewed sx/signs elevated BP   - Normotensive to low mild range, asymptomatic   - Continue to monitor     Maternal Well-Being  - emotional support provided  - bonding with infant in NICU     Feeding  - breastfeeding/pumping encouraged; lactation consult prn     Contraception  - undecided    Dispo  - Anticipate DC PPD5 pending BP control.  - The signs and symptoms of PEC were reviewed with the patient, including unrelenting headache, vision changes/blurred vision, and pain underneath the right breast.   - BP cuff for home for checking BP BID. Pt instructed to call primary OB if SBP > 160 or DBP > 110.  - On discharge, follow up with primary OB in 2-5 days for BP check, 2 weeks for incision check, and 4-6 weeks for postpartum visit.     Queta Dolan PA-C     Principal Problem:    39 weeks gestation of pregnancy  Active Problems:    Pre-eclampsia in third trimester     Hypokalemia    Pregnancy Problems (from 23 to present)       Problem Noted Resolved    Pre-eclampsia in third trimester 2023 by Lubna Zabala MD No    Priority:  Medium      39 weeks gestation of pregnancy 2023 by Lubna Zabala MD No    Priority:  Medium      Encounter for supervision of normal first pregnancy in third trimester 10/24/2023 by ALEJA Murphy No    Priority:  Medium      Overview Signed 10/24/2023  6:08 PM by ALEJA Murphy     Covid vaccinated, declines booster  Declines Flu  [facility] course: PEC without SF   section delivery  Patient is currently breastfeedingThe patient's blood type is O POS. The baby's blood type is O POS . Rhogam is not indicated.    Subjective   Her pain is well controlled with current medications  She is passing flatus  She is ambulating well  She is tolerating a Adult diet Regular  She reports no breast or nursing problems  She denies emotional concerns today   Her plan for contraception is TBD     Very pleasant.  No complaints.  BP's not well controlled on new regimen, pt. Agrees to continued monitoring until well controlled.  Continues to be asx.      Objective   Allergies:   Patient has no known allergies.         Last Vitals:  Temp Pulse Resp BP MAP Pulse Ox   36.7 °C (98.1 °F) 87 18 116/70   97 %     Vitals Min/Max Last 24 Hours:  Temp  Min: 36.4 °C (97.5 °F)  Max: 36.8 °C (98.2 °F)  Pulse  Min: 84  Max: 108  Resp  Min: 16  Max: 18  BP  Min: 106/69  Max: 128/85    Intake/Output:     Intake/Output Summary (Last 24 hours) at 2023 1431  Last data filed at 2023 0638  Gross per 24 hour   Intake --   Output 1200 ml   Net -1200 ml       Physical Exam:  General: well appearing, well nourished, postpartum  Obstetric: fundus firm below umbilicus, lochia light  Skin: Warm, dry; no rashes/lesions/erythema; LTCS incision covered with long-term bandage without shadowing.   Breast: No  masses, nipple discharge  Neuro: A/Ox3, conversational, no gross motor deficit   GI: mild distension, appropriately tender, soft  Respiratory: Even and unlabored on RA  Cardiovascular: Trace BLE edema; No erythema, warmth  Psych: appropriate mood and affect      Lab Data:  Labs in chart were reviewed.    Principal Problem:    39 weeks gestation of pregnancy  Active Problems:    Pre-eclampsia in third trimester    Hypokalemia    Pregnancy Problems (from 23 to present)       Problem Noted Resolved    Pre-eclampsia in third trimester 2023 by Lubna Zabala MD No    Priority:  Medium      39 weeks gestation of pregnancy 2023 by Lubna Zablaa MD No    Priority:  Medium      Encounter for supervision of normal first pregnancy in third trimester 10/24/2023 by ALEJA Murphy No    Priority:  Medium      Overview Signed 10/24/2023  6:08 PM by ALEJA Murphy     [facility], declines booster  [facility]                 [facility] course: no complications   section delivery  Patient is not breastfeedingThe patient's blood type is O POS. The baby's blood type is O POS . Rhogam is not indicated.    Subjective   Her pain is well controlled with current medications  She is passing flatus  She is ambulating well  She is tolerating a Adult diet Regular  She reports no breast or nursing problems  She denies emotional concerns today   Her plan for contraception is none       Objective   Allergies:   Patient has no known allergies.         Last Vitals:  Temp Pulse Resp BP MAP Pulse Ox   36.6 °C (97.9 °F) 75 16 116/76   95 %     Vitals Min/Max Last 24 Hours:  Temp  Min: 36 °C (96.8 °F)  Max: 36.6 °C (97.9 °F)  Pulse  Min: 75  Max: 88  Resp  Min: 16  Max: 18  BP  Min: 114/70  Max: 129/82    Intake/Output:   No intake or output data in the 24 hours ending 23 1208    Physical Exam:  General: Examination reveals a well developed, well nourished, female, in no  acute distress. She is alert and cooperative.  Lungs: symmetrical, non-labored breathing.  Cardiac: warm, well-perfused.  Abdomen: soft, non-tender.  Fundus: firm, at umbilicus, and nontender.  Extremities: no redness or tenderness in the calves or thighs.  Neurological: alert, oriented, normal speech, no focal findings or movement disorder noted.      Lab Data:  Lab Results   Component Value Date    WBC 15.9 (H) 2023    HGB 9.2 (L) 2023    HCT 27.6 (L) 2023     (L) 2023     Lab Results   Component Value Date    GLUCOSE 85 12/10/2023     12/10/2023    K 3.7 12/10/2023     12/10/2023    CO2 16 (L) 12/10/2023    ANIONGAP 20 12/10/2023    BUN 6 12/10/2023    CREATININE 0.99 12/10/2023    EGFR 82 12/10/2023    CALCIUM 8.7 12/10/2023    ALBUMIN 3.8 12/10/2023    PROT 6.7 12/10/2023    ALKPHOS 168 (H) 12/10/2023    ALT 6 (L) 12/10/2023    AST 18 12/10/2023    BILITOT 0.6 12/10/2023       Active Problems:    Pre-eclampsia, antepartum    Hypokalemia    Pregnancy Problems (from 23 to present)       Problem Noted Resolved    Pre-eclampsia, antepartum 2023 by Lubna Zabala MD No    Priority:  Medium      39 weeks gestation of pregnancy 2023 by Lubna Zabala MD 2023 by CHELY Resendiz    Priority:  Medium      Encounter for supervision of normal first pregnancy in third trimester 10/24/2023 by ALEJA Murphy 2023 by CHELY Resendiz    Priority:  Medium      Overview Signed 10/24/2023  6:08 PM by ALEJA Murphy     Covid vaccinated, declines booster  [facility]                 [facility] course: no complications   section delivery   The patient's blood type is O POS. The baby's blood type is O POS . Rhogam is not indicated.    Subjective   Her pain is well controlled with current medications  She is passing flatus  She is ambulating well  She is tolerating a Adult diet Regular  She reports no  "breast or nursing problems  She denies emotional concerns today   Her plan for contraception is none       Objective   Allergies:   Patient has no known allergies.         Last Vitals:  Temp Pulse Resp BP MAP Pulse Ox   36.6 °C (97.9 °F) 70 18 (!) 143/84   98 %     Vitals Min/Max Last 24 Hours:  Temp  Min: 36.5 °C (97.7 °F)  Max: 36.8 °C (98.2 °F)  Pulse  Min: 59  Max: 76  Resp  Min: 18  Max: 18  BP  Min: 122/71  Max: 157/94    Intake/Output:   No intake or output data in the 24 hours ending 12/14/23 1648      Lab Data:  Lab Results   Component Value Date    WBC 11.1 12/13/2023    HGB 8.4 (L) 12/13/2023    HCT 25.1 (L) 12/13/2023     12/13/2023     No results found for: \"GLUCOSE\", \"NA\", \"K\", \"CL\", \"CO2\", \"ANIONGAP\", \"BUN\", \"CREATININE\", \"EGFR\", \"CALCIUM\", \"ALBUMIN\", \"PROT\", \"ALKPHOS\", \"ALT\", \"AST\", \"BILITOT\"  "

## 2023-12-14 NOTE — CARE PLAN
The patient's goals for the shift include discharge    The clinical goals for the shift include bp remains below 150/100 with meds    Pt with mildly elevated BP s today, requiring increase in PO antihypertensives, other vitals stable. Pain being well controlled with PO pain meds.

## 2023-12-15 ENCOUNTER — PHARMACY VISIT (OUTPATIENT)
Dept: PHARMACY | Facility: CLINIC | Age: 23
End: 2023-12-15
Payer: COMMERCIAL

## 2023-12-15 VITALS
WEIGHT: 169.75 LBS | DIASTOLIC BLOOD PRESSURE: 83 MMHG | TEMPERATURE: 97.9 F | HEART RATE: 68 BPM | OXYGEN SATURATION: 98 % | SYSTOLIC BLOOD PRESSURE: 122 MMHG | BODY MASS INDEX: 28.28 KG/M2 | HEIGHT: 65 IN | RESPIRATION RATE: 17 BRPM

## 2023-12-15 PROCEDURE — 2500000001 HC RX 250 WO HCPCS SELF ADMINISTERED DRUGS (ALT 637 FOR MEDICARE OP)

## 2023-12-15 PROCEDURE — RXMED WILLOW AMBULATORY MEDICATION CHARGE

## 2023-12-15 PROCEDURE — 2500000001 HC RX 250 WO HCPCS SELF ADMINISTERED DRUGS (ALT 637 FOR MEDICARE OP): Performed by: NURSE PRACTITIONER

## 2023-12-15 PROCEDURE — 99231 SBSQ HOSP IP/OBS SF/LOW 25: CPT | Performed by: NURSE PRACTITIONER

## 2023-12-15 PROCEDURE — 96372 THER/PROPH/DIAG INJ SC/IM: CPT

## 2023-12-15 PROCEDURE — 2500000004 HC RX 250 GENERAL PHARMACY W/ HCPCS (ALT 636 FOR OP/ED)

## 2023-12-15 RX ORDER — LABETALOL 200 MG/1
600 TABLET, FILM COATED ORAL EVERY 8 HOURS
Status: DISCONTINUED | OUTPATIENT
Start: 2023-12-15 | End: 2023-12-15 | Stop reason: HOSPADM

## 2023-12-15 RX ORDER — ACETAMINOPHEN 500 MG
1000 TABLET ORAL EVERY 6 HOURS PRN
Qty: 120 TABLET | Refills: 0 | Status: SHIPPED | OUTPATIENT
Start: 2023-12-15 | End: 2024-04-18 | Stop reason: ALTCHOICE

## 2023-12-15 RX ORDER — DOCUSATE SODIUM 100 MG/1
100 CAPSULE, LIQUID FILLED ORAL 2 TIMES DAILY PRN
Qty: 60 CAPSULE | Refills: 0 | Status: SHIPPED | OUTPATIENT
Start: 2023-12-15 | End: 2024-01-14

## 2023-12-15 RX ORDER — OXYCODONE HYDROCHLORIDE 5 MG/1
5 TABLET ORAL EVERY 6 HOURS PRN
Qty: 5 TABLET | Refills: 0 | Status: SHIPPED | OUTPATIENT
Start: 2023-12-15 | End: 2023-12-20

## 2023-12-15 RX ORDER — IBUPROFEN 600 MG/1
600 TABLET ORAL EVERY 6 HOURS PRN
Qty: 120 TABLET | Refills: 0 | Status: SHIPPED | OUTPATIENT
Start: 2023-12-15 | End: 2024-01-14

## 2023-12-15 RX ORDER — LABETALOL 200 MG/1
600 TABLET, FILM COATED ORAL EVERY 8 HOURS
Qty: 270 TABLET | Refills: 1 | Status: SHIPPED | OUTPATIENT
Start: 2023-12-15 | End: 2024-04-18 | Stop reason: ALTCHOICE

## 2023-12-15 RX ORDER — NALOXONE HYDROCHLORIDE 4 MG/.1ML
4 SPRAY NASAL AS NEEDED
Qty: 2 EACH | Refills: 1 | Status: SHIPPED | OUTPATIENT
Start: 2023-12-15 | End: 2023-12-18 | Stop reason: ALTCHOICE

## 2023-12-15 RX ADMIN — IBUPROFEN 600 MG: 600 TABLET, FILM COATED ORAL at 04:38

## 2023-12-15 RX ADMIN — IBUPROFEN 600 MG: 600 TABLET, FILM COATED ORAL at 16:28

## 2023-12-15 RX ADMIN — LABETALOL HYDROCHLORIDE 400 MG: 200 TABLET, FILM COATED ORAL at 00:25

## 2023-12-15 RX ADMIN — ACETAMINOPHEN 975 MG: 325 TABLET ORAL at 04:38

## 2023-12-15 RX ADMIN — IBUPROFEN 600 MG: 600 TABLET, FILM COATED ORAL at 10:11

## 2023-12-15 RX ADMIN — LABETALOL HYDROCHLORIDE 600 MG: 200 TABLET, FILM COATED ORAL at 16:28

## 2023-12-15 RX ADMIN — ACETAMINOPHEN 975 MG: 325 TABLET ORAL at 16:28

## 2023-12-15 RX ADMIN — ENOXAPARIN SODIUM 40 MG: 100 INJECTION SUBCUTANEOUS at 10:11

## 2023-12-15 RX ADMIN — LABETALOL HYDROCHLORIDE 600 MG: 200 TABLET, FILM COATED ORAL at 08:25

## 2023-12-15 RX ADMIN — ACETAMINOPHEN 975 MG: 325 TABLET ORAL at 10:10

## 2023-12-15 ASSESSMENT — PAIN DESCRIPTION - ORIENTATION: ORIENTATION: LOWER

## 2023-12-15 ASSESSMENT — PAIN SCALES - GENERAL
PAINLEVEL_OUTOF10: 4
PAINLEVEL_OUTOF10: 2
PAINLEVEL_OUTOF10: 3

## 2023-12-15 ASSESSMENT — PAIN - FUNCTIONAL ASSESSMENT: PAIN_FUNCTIONAL_ASSESSMENT: 0-10

## 2023-12-15 NOTE — DISCHARGE SUMMARY
Discharge Summary    Admission Date: 2023  Discharge Date: 12/15/23  Discharge Diagnosis: LTCS; PEC w SF    Patient Active Problem List   Diagnosis    Acne comedone    Attention deficit    Depression    Dermatitis venenata    Generalized anxiety disorder with panic attacks    Migraine variant    Migraine    Allergic contact dermatitis due to other chemical products    Low back strain    Pre-eclampsia, antepartum    Hypokalemia     delivery delivered       Hospital Course  Dary Costa is a 23 y.o.,     Admission Date: 2023    Delivery Date: 12/10/2023  7:45 AM     Delivery type: , Low Transverse      GA at delivery: 39w4d    Outcome: Living     Anesthesia during delivery: Epidural     Intrapartum complications: Uterine Atony;Hemorrhage     Feeding method: Breastfeeding Status: Unknown    Contraception: Defers contraception to primary OB/PP visit. We discussed pregnancy spacing of at least one year, abstaining from intercourse for 6wks, and the ability to become pregnant in the absence of regular menses. Pt verbalized understanding.     Rhogam: The patient's blood type is O POS. The baby's blood type is O POS . Rhogam is not indicated.     Now postpartum day: 5.    Hospital course n/f:  PEC w/o SF -> w SF  - Dx by severe range pressures req IV tx (pm)  - rapid uptitration of labet: started labetalol 200BID ()- >14 400TID -> 12/15 600 q8  - HELLP labs negative x2; P:C 0.35  - BP cuff for home  - Reviewed sx/signs elevated BP   - Normotensive to mild range, asymptomatic     We discussed risks/benefits/alternatives of DC. Given a significant anxiety component r/t BP's, that she is POD5, and responding very well to new 600mg q8 regimen, shared decision making utilized to determine OK for pt to DC today.   Last severe range 12/15 0909 <1hr after increased dose of labetalol given. After new med regimen, remainder of BP's normotensive to low mild range.     PP course  otherwise uneventful.  Meeting all postpartum milestones- ambulating independently, passing flatus, tolerating PO intake, lochia light, voiding spontaneously, and pain well controlled with PO meds.     Dispo  - The signs and symptoms of PEC were reviewed with the patient, including unrelenting headache, vision changes/blurred vision, and RUQ pain  - BP cuff for home for checking BP twice a day  - Pt instructed to call primary OB if SBP > 160 or DBP > 110 or if development of PEC symptoms   - On discharge, follow up with primary OB in:       - 2-5 days for BP check       - 2 weeks for incision check       - 4-6 week for post-partum visit     Pertinent Physical Exam At Time of Discharge  General: well appearing, well nourished, postpartum  Obstetric: fundus firm below umbilicus, lochia light  Skin: Warm, dry; no rashes/lesions/erythema; dressing CDI  Breast: No masses, nipple discharge  Neuro: A/Ox3, conversational, no gross motor deficit   GI: no distension, appropriately tender, soft, +BS  Respiratory: Even and unlabored on RA, LSCTA BL  Cardiovascular: 2+ BLE edema; No erythema, warmth  Psych: appropriate mood and affect       Your medication list        START taking these medications        Instructions Last Dose Given Next Dose Due   docusate sodium 100 mg capsule  Commonly known as: Colace      Take 1 capsule (100 mg) by mouth 2 times a day as needed for constipation.        mg tablet  Generic drug: ibuprofen      Take 1 tablet (600 mg) by mouth every 6 hours if needed for moderate pain (4 - 6) (pain).       labetalol 200 mg tablet  Commonly known as: Normodyne      Take 3 tablets (600 mg) by mouth every 8 hours.       Narcan 4 mg/0.1 mL nasal spray  Generic drug: naloxone      Administer 1 spray (4 mg) into affected nostril(s) if needed for opioid reversal or respiratory depression. May repeat every 2-3 minutes if needed, alternating nostrils, until medical assistance becomes available.       oxyCODONE 5  mg immediate release tablet  Commonly known as: Roxicodone      Take 1 tablet (5 mg) by mouth every 6 hours if needed for severe pain (7 - 10) for up to 5 days.              CHANGE how you take these medications        Instructions Last Dose Given Next Dose Due   acetaminophen 500 mg tablet  Commonly known as: Tylenol  What changed:   how much to take  reasons to take this      Take 2 tablets (1,000 mg) by mouth every 6 hours if needed for moderate pain (4 - 6).              CONTINUE taking these medications        Instructions Last Dose Given Next Dose Due   ferrous sulfate 325 (65 Fe) MG EC tablet                  STOP taking these medications      ondansetron 4 mg tablet  Commonly known as: Zofran                  Where to Get Your Medications        These medications were sent to [facility]  [address] [place] [address]      Hours: 8:30 AM to 5 PM Mon-Fri Phone: [phone]   acetaminophen 500 mg tablet  docusate sodium 100 mg capsule   mg tablet  labetalol 200 mg tablet  Narcan 4 mg/0.1 mL nasal spray  oxyCODONE 5 mg immediate release tablet           Outpatient Follow-Up  No future appointments.    Katlin Quinn, APRN-CNP

## 2023-12-15 NOTE — CARE PLAN
The patient's goals for the shift include discharge    The clinical goals for the shift include maintain stable blood pressures      Problem: Safety - Adult  Goal: Free from fall injury  Outcome: Met     Problem: Hypertensive Disorder of Pregnancy (HDP)  Goal: Minimal s/sx of HDP and BP<160/110  Outcome: Met     Problem: Pain - Adult  Goal: Verbalizes/displays adequate comfort level or baseline comfort level  Outcome: Met     Problem: Discharge Planning  Goal: Discharge to home or other facility with appropriate resources  Outcome: Met     Problem: Postpartum  Goal: Experiences normal postpartum course  Outcome: Met  Goal: Appropriate maternal -  bonding  Outcome: Met  Goal: Establish and maintain infant feeding pattern for adequate nutrition  Outcome: Met  Goal: Incisions, wounds, or drain sites healing without S/S of infection  Outcome: Met  Goal: No s/sx infection  Outcome: Met  Goal: No s/sx of hemorrhage  Outcome: Met     Problem: Postpartum hemorrhage  Goal: Hemodynamic stability and limit blood loss  Outcome: Met

## 2023-12-18 ENCOUNTER — POSTPARTUM VISIT (OUTPATIENT)
Dept: OBSTETRICS AND GYNECOLOGY | Facility: CLINIC | Age: 23
End: 2023-12-18
Payer: COMMERCIAL

## 2023-12-18 VITALS
HEIGHT: 66 IN | DIASTOLIC BLOOD PRESSURE: 92 MMHG | BODY MASS INDEX: 23.84 KG/M2 | WEIGHT: 148.38 LBS | SYSTOLIC BLOOD PRESSURE: 132 MMHG

## 2023-12-18 PROBLEM — O14.90 PRE-ECLAMPSIA, ANTEPARTUM (HHS-HCC): Status: RESOLVED | Noted: 2023-12-09 | Resolved: 2023-12-18

## 2023-12-18 PROCEDURE — 0503F POSTPARTUM CARE VISIT: CPT | Performed by: OBSTETRICS & GYNECOLOGY

## 2023-12-18 ASSESSMENT — EDINBURGH POSTNATAL DEPRESSION SCALE (EPDS)
THINGS HAVE BEEN GETTING ON TOP OF ME: YES, SOMETIMES I HAVEN'T BEEN COPING AS WELL AS USUAL
I HAVE BEEN SO UNHAPPY THAT I HAVE BEEN CRYING: NO, NEVER
I HAVE BEEN ABLE TO LAUGH AND SEE THE FUNNY SIDE OF THINGS: AS MUCH AS I ALWAYS COULD
I HAVE BEEN SO UNHAPPY THAT I HAVE HAD DIFFICULTY SLEEPING: NOT AT ALL
TOTAL SCORE: 6
I HAVE LOOKED FORWARD WITH ENJOYMENT TO THINGS: AS MUCH AS I EVER DID
I HAVE FELT SAD OR MISERABLE: NO, NOT AT ALL
THE THOUGHT OF HARMING MYSELF HAS OCCURRED TO ME: NEVER
I HAVE FELT SCARED OR PANICKY FOR NO GOOD REASON: NO, NOT AT ALL
I HAVE BLAMED MYSELF UNNECESSARILY WHEN THINGS WENT WRONG: YES, SOME OF THE TIME
I HAVE BEEN ANXIOUS OR WORRIED FOR NO GOOD REASON: YES, SOMETIMES

## 2023-12-18 NOTE — PROGRESS NOTES
"Patient here for 1 week postpartum follow up  Delivered by section on 12/10/2023, incision check    ABIGAIL Tamayo    Postpartum Visit    HPI:  Pt presents for her incision check  and blood pressure check  s/p  section for arrest of descent in second stage  on 23 complicated by PPH requiring a blood transfusion and sPEC (did not get mag as was more than 24 hours postpartum), on labetalol 600 mg TID  .  She had a baby girl.  She is breast and bottle feeding.  Overall feeling well.  Pain improving, taking tylenol/motrin.  Normal bowel/bladder function.  Has a mild headache that improves with tylenol/motrin.  BP here mild range.     ROS:  Denies the following: Complains of the following:    - episiotomy site pain   - incisional drainage  - heavy bleeding  - irregular bleeding  - breast pain  - cracked nipples  - breastfeeding problems  - abdominal pain  - vaginal discharge  - shortness of breath  - chest pain  - back pain  - leg pain  - depression  - suicidal ideation  - nausea  - vomiting  - fever  - pain with urination - incisional pain  - tiredness or fatigue  - headache       O:  BP (!) 132/92 (BP Location: Right arm, Patient Position: Sitting, BP Cuff Size: Adult)   Ht 1.676 m (5' 6\")   Wt 67.3 kg (148 lb 6 oz)   LMP 2023   Breastfeeding Yes   BMI 23.95 kg/m²     General Appearance   - consistent with stated age, well groomed and cooperative    Integumentary  - skin warm and dry without rash    Head and Neck  - normalocephalic and neck supple    Chest and Lung Exam  - normal breathing effort, no respiratory distress    Abdomen  - soft, appropriately tender, bandage removed, incision c/d/I and healing well    A/P:   Pt is a 23 y.o.  who presents for incision check  and blood pressure check .  Doing well overall postpartum.  Coping well with new baby at home and baby is doing well.  [place]  Depression Scale Total: 6.   Bp appropriate for now on labetalol 600 mg BID, " discussed will likely need to wean in next few days to weeks   Her headache is relieved with pain medications, discussed if it is not relieved with pain medications she needs to call immediately or if bps at home >160/110.  Her incision is healing well.  Reviewed events of her labor and delivery in detail with patient and , discussed her concerns will be addressed.  Provided support for a difficult labor, delivery and postpartum course.  Discussed a planned repeat  section in her next pregnancy. RTC 2 wks for close bp follow up and given need to likely wean BP meds soon.     Jayda Blood MD

## 2023-12-28 ENCOUNTER — TELEPHONE (OUTPATIENT)
Dept: OBSTETRICS AND GYNECOLOGY | Facility: CLINIC | Age: 23
End: 2023-12-28
Payer: COMMERCIAL

## 2023-12-28 LAB
LABORATORY COMMENT REPORT: NORMAL
PATH REPORT.FINAL DX SPEC: NORMAL
PATH REPORT.GROSS SPEC: NORMAL
PATH REPORT.RELEVANT HX SPEC: NORMAL
PATH REPORT.TOTAL CANCER: NORMAL

## 2024-01-02 ENCOUNTER — POSTPARTUM VISIT (OUTPATIENT)
Dept: OBSTETRICS AND GYNECOLOGY | Facility: CLINIC | Age: 24
End: 2024-01-02
Payer: COMMERCIAL

## 2024-01-02 ENCOUNTER — APPOINTMENT (OUTPATIENT)
Dept: OBSTETRICS AND GYNECOLOGY | Facility: CLINIC | Age: 24
End: 2024-01-02
Payer: COMMERCIAL

## 2024-01-02 VITALS
HEIGHT: 66 IN | WEIGHT: 143.5 LBS | DIASTOLIC BLOOD PRESSURE: 82 MMHG | SYSTOLIC BLOOD PRESSURE: 112 MMHG | BODY MASS INDEX: 23.06 KG/M2

## 2024-01-02 PROCEDURE — 0503F POSTPARTUM CARE VISIT: CPT | Performed by: OBSTETRICS & GYNECOLOGY

## 2024-01-02 ASSESSMENT — EDINBURGH POSTNATAL DEPRESSION SCALE (EPDS)
THE THOUGHT OF HARMING MYSELF HAS OCCURRED TO ME: NEVER
I HAVE FELT SCARED OR PANICKY FOR NO GOOD REASON: NO, NOT MUCH
I HAVE BEEN SO UNHAPPY THAT I HAVE HAD DIFFICULTY SLEEPING: NOT AT ALL
I HAVE LOOKED FORWARD WITH ENJOYMENT TO THINGS: AS MUCH AS I EVER DID
I HAVE FELT SAD OR MISERABLE: NOT VERY OFTEN
I HAVE BEEN SO UNHAPPY THAT I HAVE BEEN CRYING: NO, NEVER
I HAVE BLAMED MYSELF UNNECESSARILY WHEN THINGS WENT WRONG: YES, SOME OF THE TIME
I HAVE BEEN ABLE TO LAUGH AND SEE THE FUNNY SIDE OF THINGS: AS MUCH AS I ALWAYS COULD
I HAVE BEEN ANXIOUS OR WORRIED FOR NO GOOD REASON: HARDLY EVER
THINGS HAVE BEEN GETTING ON TOP OF ME: NO, MOST OF THE TIME I HAVE COPED QUITE WELL
TOTAL SCORE: 6

## 2024-01-02 NOTE — PROGRESS NOTES
"Patient here for postpartum visit  Delivered by  on 12/10/2023  Patient is 3 weeks postpartum , BP check on meds    ABIGAIL Tamayo    Postpartum Visit    HPI:  Pt presents for 3 wk bp  s/p  section for arrest of descent in second stage  on 12/10/23 complicated by PPH requiring a blood transfusion and sPEC (did not get mag as was more than 24 hours postpartum).  Currently on labetalol 600 mg TID  .  Her headaches have improved.  Bps now 100-130s/60-80s per her log.  Having some occasional incisional pain/burning but overall feeling well.      ROS:  Denies the following: Complains of the following:    - episiotomy site pain   - incisional drainage  - heavy bleeding  - irregular bleeding  - breast pain  - cracked nipples  - breastfeeding problems  - abdominal pain  - vaginal discharge  - shortness of breath  - chest pain  - back pain  - leg pain  - depression  - suicidal ideation  - nausea  - vomiting  - fever  - pain with urination - incisional pain  - tiredness or fatigue  - headache       O:  /82 (BP Location: Right arm, Patient Position: Sitting, BP Cuff Size: Adult)   Ht 1.676 m (5' 6\")   Wt 65.1 kg (143 lb 8 oz)   Breastfeeding Yes   BMI 23.16 kg/m²     General Appearance   - consistent with stated age, well groomed and cooperative    Integumentary  - skin warm and dry without rash    Head and Neck  - normalocephalic and neck supple    Chest and Lung Exam  - normal breathing effort, no respiratory distress    Abdomen  - soft, appropriately tender, incision c/d/I and healing well    A/P:   Pt is a 23 y.o.  who presents for 3wk incision check  and blood pressure check .  Doing well overall postpartum.  Coping well with new baby at home and baby is doing well.  [place]  Depression Scale Total: 6.  Bps now trending lower especially over last few days.  Will decrease her meds from labetalol 600 mg TID to BID.  Discussed will likely need to wean further over next few " days to weeks. Patient will call with low numbers after decreasing to BID.  Overall doing well.  RTC for 6 wk postpartum visit or earlier with any concerns.    Jayda Blood MD

## 2024-01-06 ENCOUNTER — DOCUMENTATION (OUTPATIENT)
Dept: OBSTETRICS AND GYNECOLOGY | Facility: HOSPITAL | Age: 24
End: 2024-01-06
Payer: COMMERCIAL

## 2024-01-06 DIAGNOSIS — O21.9 NAUSEA AND VOMITING IN PREGNANCY PRIOR TO 22 WEEKS GESTATION (HHS-HCC): ICD-10-CM

## 2024-01-06 DIAGNOSIS — K59.00 CONSTIPATION, UNSPECIFIED CONSTIPATION TYPE: Primary | ICD-10-CM

## 2024-01-06 RX ORDER — ADHESIVE BANDAGE
5 BANDAGE TOPICAL 2 TIMES DAILY PRN
Qty: 360 ML | Refills: 0 | Status: SHIPPED | OUTPATIENT
Start: 2024-01-06 | End: 2024-01-16

## 2024-01-06 RX ORDER — POLYETHYLENE GLYCOL 3350 17 G/17G
17 POWDER, FOR SOLUTION ORAL DAILY
Qty: 30 PACKET | Refills: 0 | Status: SHIPPED | OUTPATIENT
Start: 2024-01-06 | End: 2024-02-05

## 2024-01-06 RX ORDER — METOCLOPRAMIDE 5 MG/1
5 TABLET ORAL ONCE AS NEEDED
Qty: 10 TABLET | Refills: 0 | Status: SHIPPED | OUTPATIENT
Start: 2024-01-06 | End: 2024-04-18 | Stop reason: WASHOUT

## 2024-01-06 NOTE — PROGRESS NOTES
Spoke with patient after confirming Idx2 re abdominal cramping and constipation. Worsening over last few days. Pain really just today, feels almost like labor cramps in pelvis radiating to back. Had to strain earlier today for small bowel movement. Constipation not normal. Minimal vaginal bleeding, no significant discharge. Developing nausea associated with constipation/mild bloating but no vomiting. Denies fevers or chills. Able to ambulate. Took last oxycodone tab left from C/S on 12/10/23 today due to increased pain with some relief. Has been taking Ibuprofen and Tylenol last few days.     A/P:  Postoperative constipation - start miralax/milk of mag, continue colace, also encouraged OTC smooth move tea and ambulation; reglan PRN for nausea; ok to take medications with breastfeeding   Encouraged to continue Ibuprofen/Tylenol  Return precautions discussed for overt signs of infection such as fevers or malodorous discharge or if nausea/vomiting unable to tolerate PO hydration     All questions answered

## 2024-01-22 ENCOUNTER — POSTPARTUM VISIT (OUTPATIENT)
Dept: OBSTETRICS AND GYNECOLOGY | Facility: CLINIC | Age: 24
End: 2024-01-22
Payer: COMMERCIAL

## 2024-01-22 VITALS
BODY MASS INDEX: 22.56 KG/M2 | HEIGHT: 66 IN | SYSTOLIC BLOOD PRESSURE: 118 MMHG | DIASTOLIC BLOOD PRESSURE: 82 MMHG | WEIGHT: 140.38 LBS

## 2024-01-22 PROCEDURE — 88175 CYTOPATH C/V AUTO FLUID REDO: CPT

## 2024-01-22 PROCEDURE — 0503F POSTPARTUM CARE VISIT: CPT | Performed by: OBSTETRICS & GYNECOLOGY

## 2024-01-22 ASSESSMENT — EDINBURGH POSTNATAL DEPRESSION SCALE (EPDS)
I HAVE LOOKED FORWARD WITH ENJOYMENT TO THINGS: AS MUCH AS I EVER DID
I HAVE FELT SCARED OR PANICKY FOR NO GOOD REASON: NO, NOT AT ALL
THE THOUGHT OF HARMING MYSELF HAS OCCURRED TO ME: NEVER
I HAVE BEEN ABLE TO LAUGH AND SEE THE FUNNY SIDE OF THINGS: AS MUCH AS I ALWAYS COULD
TOTAL SCORE: 2
I HAVE BLAMED MYSELF UNNECESSARILY WHEN THINGS WENT WRONG: NOT VERY OFTEN
I HAVE BEEN SO UNHAPPY THAT I HAVE BEEN CRYING: NO, NEVER
I HAVE BEEN SO UNHAPPY THAT I HAVE HAD DIFFICULTY SLEEPING: NOT AT ALL
I HAVE BEEN ANXIOUS OR WORRIED FOR NO GOOD REASON: NO, NOT AT ALL
I HAVE FELT SAD OR MISERABLE: NO, NOT AT ALL
THINGS HAVE BEEN GETTING ON TOP OF ME: NO, MOST OF THE TIME I HAVE COPED QUITE WELL

## 2024-01-22 NOTE — PROGRESS NOTES
"Patient presents for 6 week postpartum visit   Delivered by  on 12/10/2023  Last PAP 3/08/2021 NEG    Sherice Francis CHAKA    Postpartum Visit    HPI:  Pt presents for 6 wk postpartum visit s/p  section for arrest of descent in second stage  on 12/10/23  c/b PPH requiring blood transfusion and sPEC (not mag as was >24 hours postpartum).  Now off bp medications and bp is normal today.  Baby doing well.  Breast and bottlefeeding.  Has some occasional pelvic pain but otherwise feeling well.  Declines birth control.     ROS:  Denies the following: Complains of the following:    - episiotomy site pain   - incisional drainage  - heavy bleeding  - irregular bleeding  - breast pain  - cracked nipples  - breastfeeding problems  - abdominal pain  - vaginal discharge  - shortness of breath  - chest pain  - back pain  - leg pain  - depression  - suicidal ideation  - nausea  - vomiting  - fever  - pain with urination -abdominal pain (occasional, goes away with motrin)         O:  /82 (BP Location: Left arm, Patient Position: Sitting, BP Cuff Size: Adult)   Ht 1.676 m (5' 6\")   Wt 63.7 kg (140 lb 6 oz)   LMP 2024   Breastfeeding No   BMI 22.66 kg/m²     General Appearance   - consistent with stated age, well groomed and cooperative    Integumentary  - skin warm and dry without rash    Head and Neck  - normalocephalic and neck supple    Chest and Lung Exam  - normal breathing effort, no respiratory distress    Abdomen  - soft, appropriately tender, incision c/d/I and well healed    Pelvic  - normal external genitalia, cervix without lesions, pap done    A/P:   Pt is a 23 y.o.  who presents for 6 wk postpartum visit.  Doing well overall postpartum.  Coping well with new baby at home and baby is doing well.  [place]  Depression Scale Total: 2.  Off Bp meds and bp is normal.  Incision well healed, cleared to return to all activity.  Pap done as patient due 3/2024.  Declines birth " control, will use condoms.  RTC 6-9 months for annual exam or earlier with any concerns.    Jayda Blood MD

## 2024-02-06 LAB
CYTOLOGY CMNT CVX/VAG CYTO-IMP: NORMAL
LAB AP HPV GENOTYPE QUESTION: NO
LAB AP HPV HR: NORMAL
LABORATORY COMMENT REPORT: NORMAL
LMP START DATE: NORMAL
PATH REPORT.TOTAL CANCER: NORMAL

## 2024-04-18 ENCOUNTER — OFFICE VISIT (OUTPATIENT)
Dept: PRIMARY CARE | Facility: CLINIC | Age: 24
End: 2024-04-18
Payer: COMMERCIAL

## 2024-04-18 VITALS
BODY MASS INDEX: 22.82 KG/M2 | SYSTOLIC BLOOD PRESSURE: 102 MMHG | DIASTOLIC BLOOD PRESSURE: 76 MMHG | RESPIRATION RATE: 14 BRPM | HEIGHT: 66 IN | OXYGEN SATURATION: 95 % | WEIGHT: 142 LBS

## 2024-04-18 DIAGNOSIS — O15.9: ICD-10-CM

## 2024-04-18 DIAGNOSIS — F34.1 DYSTHYMIA: Primary | ICD-10-CM

## 2024-04-18 PROCEDURE — 99214 OFFICE O/P EST MOD 30 MIN: CPT | Performed by: INTERNAL MEDICINE

## 2024-04-18 PROCEDURE — 1036F TOBACCO NON-USER: CPT | Performed by: INTERNAL MEDICINE

## 2024-04-18 RX ORDER — ESCITALOPRAM OXALATE 5 MG/1
5 TABLET ORAL DAILY
Qty: 30 TABLET | Refills: 2 | Status: SHIPPED | OUTPATIENT
Start: 2024-04-18 | End: 2024-05-20 | Stop reason: SDUPTHER

## 2024-04-18 ASSESSMENT — ENCOUNTER SYMPTOMS
HALLUCINATIONS: 0
DIAPHORESIS: 0
SHORTNESS OF BREATH: 0
COUGH: 0
NERVOUS/ANXIOUS: 0
VOMITING: 0
NAUSEA: 0
FEVER: 0
DIARRHEA: 0
CHILLS: 0
MYALGIAS: 0
JOINT SWELLING: 0
CONSTIPATION: 0
DYSPHORIC MOOD: 0

## 2024-04-18 NOTE — PROGRESS NOTES
"Subjective   Dary Costa is a 23 y.o. female who presents for FOLLOW UP   NEW CONCERNS RAGE AFTER HAVING BABY   FEELS LIKE SHE IS STILL HAVING CONTRACTIONS HAD CXN   HAVING CONSTIPATION COLACE AND MILK OF MAG MIRLAX HELPED FOR A LITTLE WHILE    ELEVATED BP POSTPARTUM PRE CLASIA    NO LONGER BREAST FEELING       HPI   STILL FEEL LIKE HAVING CONTRACTIONS, ALL THROUGH ABDOMEN AND INTO HER BACK    OTHER THAN THAT EVERYTHING FEELS OK    POST PARTUM RAGE.  LOSE TEMPER VERY EASILY    NOT FEELING DEPRESSED.  NOT ANXIOUS.  NOT THINKING OF SUICIDE BUT PUNCH WALLS AND SCREAM    POST PARTUM PRE-ECLAMPSIA.  TOOK LABETALOL 600 TID, FOR 6 WEEKS AFTER DELIVERY, AND THEN WAS TAKEN OFF OF THEM.    NEVER HAD ISSUES LIKE THIS IN THE PAST.      Review of Systems   Constitutional:  Negative for chills, diaphoresis and fever.   Respiratory:  Negative for cough and shortness of breath.    Cardiovascular:  Negative for chest pain and leg swelling.   Gastrointestinal:  Negative for constipation, diarrhea, nausea and vomiting.   Musculoskeletal:  Negative for joint swelling and myalgias.   Psychiatric/Behavioral:  Negative for dysphoric mood, hallucinations, self-injury and suicidal ideas. The patient is not nervous/anxious.         ANGER ISSUES       Objective   /76   Resp 14   Ht 1.676 m (5' 6\")   Wt 64.4 kg (142 lb)   SpO2 95%   BMI 22.92 kg/m²     Physical Exam  Vitals reviewed.   Constitutional:       General: She is not in acute distress.     Appearance: She is not ill-appearing.   Cardiovascular:      Rate and Rhythm: Normal rate and regular rhythm.      Pulses: Normal pulses.      Heart sounds:      No gallop.   Pulmonary:      Breath sounds: Normal breath sounds. No wheezing, rhonchi or rales.   Abdominal:      General: Abdomen is flat. Bowel sounds are normal.      Palpations: Abdomen is soft.      Tenderness: There is no guarding or rebound.   Musculoskeletal:      Right lower leg: No edema.      Left lower leg: No " edema.   Skin:     General: Skin is warm and dry.   Neurological:      General: No focal deficit present.      Mental Status: She is oriented to person, place, and time.   Psychiatric:         Mood and Affect: Mood normal.         Behavior: Behavior normal.         Thought Content: Thought content normal.         Judgment: Judgment normal.         Assessment/Plan   Problem List Items Addressed This Visit       Eclampsia (toxemia of pregnancy) ([facility])     Other Visit Diagnoses       Dysthymia    -  Primary    Relevant Medications    escitalopram (Lexapro) 5 mg tablet          Patient Instructions    YOU APPEAR TO BE HAVING SOME FORM OF POST-PARTUM DYSTHYMIA.    2.  BECAUSE SHORT FUSE SEEMS TO BE THE ISSUE, RECOMMEND THE SPECIFIC SSRI MEDICATION ESCITALOPRAM/LEXAPRO AT A LOW DOSE    3.  WE DISCUSSED THE VERY SMALL RISK OF SUICIDAL IDEATION, IF YOU EXPERIENCE THIS TO STOP AND CALL ME    4.  WE ALSO DISCUSSED THAT PRE=ECLAMPSIA IS LIKELY TO RETURN WITH ANY FUTURE PREGNANCY, AND THAT PRE-ECLAMPSIA WILL INCREASE YOUR RISK MARGINALLY OF DEVELOPING REGULAR HYPERTENSION LATER IN LIFE.  SO KEEPING AN EYE ON BP THROUGHOUT YOUR LIFE IS INDICATED.    5.  FOLLOW UP 1  MONTH OR AS NEEDED.

## 2024-04-18 NOTE — PATIENT INSTRUCTIONS
YOU APPEAR TO BE HAVING SOME FORM OF POST-PARTUM DYSTHYMIA.    2.  BECAUSE SHORT FUSE SEEMS TO BE THE ISSUE, RECOMMEND THE SPECIFIC SSRI MEDICATION ESCITALOPRAM/LEXAPRO AT A LOW DOSE    3.  WE DISCUSSED THE VERY SMALL RISK OF SUICIDAL IDEATION, IF YOU EXPERIENCE THIS TO STOP AND CALL ME    4.  WE ALSO DISCUSSED THAT PRE=ECLAMPSIA IS LIKELY TO RETURN WITH ANY FUTURE PREGNANCY, AND THAT PRE-ECLAMPSIA WILL INCREASE YOUR RISK MARGINALLY OF DEVELOPING REGULAR HYPERTENSION LATER IN LIFE.  SO KEEPING AN EYE ON BP THROUGHOUT YOUR LIFE IS INDICATED.    5.  FOLLOW UP 1  MONTH OR AS NEEDED.

## 2024-05-08 ENCOUNTER — APPOINTMENT (OUTPATIENT)
Dept: RADIOLOGY | Facility: HOSPITAL | Age: 24
End: 2024-05-08
Payer: COMMERCIAL

## 2024-05-08 ENCOUNTER — HOSPITAL ENCOUNTER (EMERGENCY)
Facility: HOSPITAL | Age: 24
Discharge: HOME | End: 2024-05-08
Attending: STUDENT IN AN ORGANIZED HEALTH CARE EDUCATION/TRAINING PROGRAM
Payer: COMMERCIAL

## 2024-05-08 VITALS
HEART RATE: 71 BPM | RESPIRATION RATE: 18 BRPM | BODY MASS INDEX: 23.3 KG/M2 | HEIGHT: 66 IN | DIASTOLIC BLOOD PRESSURE: 63 MMHG | TEMPERATURE: 97.2 F | WEIGHT: 145 LBS | SYSTOLIC BLOOD PRESSURE: 136 MMHG | OXYGEN SATURATION: 98 %

## 2024-05-08 DIAGNOSIS — K52.9 COLITIS: Primary | ICD-10-CM

## 2024-05-08 LAB
ALBUMIN SERPL BCP-MCNC: 4.7 G/DL (ref 3.4–5)
ALP SERPL-CCNC: 105 U/L (ref 33–110)
ALT SERPL W P-5'-P-CCNC: 16 U/L (ref 7–45)
ANION GAP SERPL CALC-SCNC: 13 MMOL/L (ref 10–20)
APPEARANCE UR: ABNORMAL
AST SERPL W P-5'-P-CCNC: 35 U/L (ref 9–39)
BASOPHILS # BLD AUTO: 0.07 X10*3/UL (ref 0–0.1)
BASOPHILS NFR BLD AUTO: 0.6 %
BILIRUB SERPL-MCNC: 0.3 MG/DL (ref 0–1.2)
BILIRUB UR STRIP.AUTO-MCNC: NEGATIVE MG/DL
BUN SERPL-MCNC: 17 MG/DL (ref 6–23)
CALCIUM SERPL-MCNC: 9.8 MG/DL (ref 8.6–10.3)
CHLORIDE SERPL-SCNC: 105 MMOL/L (ref 98–107)
CO2 SERPL-SCNC: 25 MMOL/L (ref 21–32)
COLOR UR: YELLOW
CREAT SERPL-MCNC: 0.65 MG/DL (ref 0.5–1.05)
EGFRCR SERPLBLD CKD-EPI 2021: >90 ML/MIN/1.73M*2
EOSINOPHIL # BLD AUTO: 0.09 X10*3/UL (ref 0–0.7)
EOSINOPHIL NFR BLD AUTO: 0.7 %
ERYTHROCYTE [DISTWIDTH] IN BLOOD BY AUTOMATED COUNT: 12 % (ref 11.5–14.5)
GLUCOSE SERPL-MCNC: 97 MG/DL (ref 74–99)
GLUCOSE UR STRIP.AUTO-MCNC: NORMAL MG/DL
HCG UR QL IA.RAPID: NEGATIVE
HCT VFR BLD AUTO: 39.7 % (ref 36–46)
HGB BLD-MCNC: 13.1 G/DL (ref 12–16)
HOLD SPECIMEN: NORMAL
IMM GRANULOCYTES # BLD AUTO: 0.05 X10*3/UL (ref 0–0.7)
IMM GRANULOCYTES NFR BLD AUTO: 0.4 % (ref 0–0.9)
KETONES UR STRIP.AUTO-MCNC: NEGATIVE MG/DL
LEUKOCYTE ESTERASE UR QL STRIP.AUTO: NEGATIVE
LIPASE SERPL-CCNC: 28 U/L (ref 9–82)
LYMPHOCYTES # BLD AUTO: 2.43 X10*3/UL (ref 1.2–4.8)
LYMPHOCYTES NFR BLD AUTO: 19.2 %
MCH RBC QN AUTO: 29.9 PG (ref 26–34)
MCHC RBC AUTO-ENTMCNC: 33 G/DL (ref 32–36)
MCV RBC AUTO: 91 FL (ref 80–100)
MONOCYTES # BLD AUTO: 0.83 X10*3/UL (ref 0.1–1)
MONOCYTES NFR BLD AUTO: 6.6 %
MUCOUS THREADS #/AREA URNS AUTO: ABNORMAL /LPF
NEUTROPHILS # BLD AUTO: 9.16 X10*3/UL (ref 1.2–7.7)
NEUTROPHILS NFR BLD AUTO: 72.5 %
NITRITE UR QL STRIP.AUTO: NEGATIVE
NRBC BLD-RTO: 0 /100 WBCS (ref 0–0)
PH UR STRIP.AUTO: 6 [PH]
PLATELET # BLD AUTO: 357 X10*3/UL (ref 150–450)
POTASSIUM SERPL-SCNC: 5.8 MMOL/L (ref 3.5–5.3)
PROT SERPL-MCNC: 7.7 G/DL (ref 6.4–8.2)
PROT UR STRIP.AUTO-MCNC: ABNORMAL MG/DL
RBC # BLD AUTO: 4.38 X10*6/UL (ref 4–5.2)
RBC # UR STRIP.AUTO: NEGATIVE /UL
RBC #/AREA URNS AUTO: ABNORMAL /HPF
SODIUM SERPL-SCNC: 137 MMOL/L (ref 136–145)
SP GR UR STRIP.AUTO: 1.02
SQUAMOUS #/AREA URNS AUTO: ABNORMAL /HPF
UROBILINOGEN UR STRIP.AUTO-MCNC: NORMAL MG/DL
WBC # BLD AUTO: 12.6 X10*3/UL (ref 4.4–11.3)
WBC #/AREA URNS AUTO: ABNORMAL /HPF

## 2024-05-08 PROCEDURE — 99285 EMERGENCY DEPT VISIT HI MDM: CPT | Performed by: STUDENT IN AN ORGANIZED HEALTH CARE EDUCATION/TRAINING PROGRAM

## 2024-05-08 PROCEDURE — 87086 URINE CULTURE/COLONY COUNT: CPT | Mod: STJLAB | Performed by: STUDENT IN AN ORGANIZED HEALTH CARE EDUCATION/TRAINING PROGRAM

## 2024-05-08 PROCEDURE — 36415 COLL VENOUS BLD VENIPUNCTURE: CPT | Performed by: STUDENT IN AN ORGANIZED HEALTH CARE EDUCATION/TRAINING PROGRAM

## 2024-05-08 PROCEDURE — 99284 EMERGENCY DEPT VISIT MOD MDM: CPT | Mod: 25

## 2024-05-08 PROCEDURE — 81001 URINALYSIS AUTO W/SCOPE: CPT | Performed by: STUDENT IN AN ORGANIZED HEALTH CARE EDUCATION/TRAINING PROGRAM

## 2024-05-08 PROCEDURE — 80053 COMPREHEN METABOLIC PANEL: CPT | Performed by: STUDENT IN AN ORGANIZED HEALTH CARE EDUCATION/TRAINING PROGRAM

## 2024-05-08 PROCEDURE — 83690 ASSAY OF LIPASE: CPT | Performed by: STUDENT IN AN ORGANIZED HEALTH CARE EDUCATION/TRAINING PROGRAM

## 2024-05-08 PROCEDURE — 74177 CT ABD & PELVIS W/CONTRAST: CPT | Performed by: RADIOLOGY

## 2024-05-08 PROCEDURE — 81025 URINE PREGNANCY TEST: CPT | Performed by: STUDENT IN AN ORGANIZED HEALTH CARE EDUCATION/TRAINING PROGRAM

## 2024-05-08 PROCEDURE — 2550000001 HC RX 255 CONTRASTS: Performed by: STUDENT IN AN ORGANIZED HEALTH CARE EDUCATION/TRAINING PROGRAM

## 2024-05-08 PROCEDURE — 85025 COMPLETE CBC W/AUTO DIFF WBC: CPT | Performed by: STUDENT IN AN ORGANIZED HEALTH CARE EDUCATION/TRAINING PROGRAM

## 2024-05-08 PROCEDURE — 2500000004 HC RX 250 GENERAL PHARMACY W/ HCPCS (ALT 636 FOR OP/ED): Performed by: EMERGENCY MEDICINE

## 2024-05-08 PROCEDURE — 74177 CT ABD & PELVIS W/CONTRAST: CPT

## 2024-05-08 RX ORDER — AMOXICILLIN AND CLAVULANATE POTASSIUM 875; 125 MG/1; MG/1
1 TABLET, FILM COATED ORAL EVERY 12 HOURS
Qty: 20 TABLET | Refills: 0 | Status: SHIPPED | OUTPATIENT
Start: 2024-05-08 | End: 2024-05-20 | Stop reason: ALTCHOICE

## 2024-05-08 RX ORDER — ONDANSETRON HYDROCHLORIDE 2 MG/ML
4 INJECTION, SOLUTION INTRAVENOUS ONCE
Status: COMPLETED | OUTPATIENT
Start: 2024-05-08 | End: 2024-05-08

## 2024-05-08 RX ORDER — ONDANSETRON 4 MG/1
4 TABLET, ORALLY DISINTEGRATING ORAL EVERY 6 HOURS PRN
Qty: 12 TABLET | Refills: 0 | Status: SHIPPED | OUTPATIENT
Start: 2024-05-08 | End: 2024-05-11

## 2024-05-08 RX ADMIN — ONDANSETRON 4 MG: 2 INJECTION INTRAMUSCULAR; INTRAVENOUS at 09:42

## 2024-05-08 RX ADMIN — IOHEXOL 75 ML: 350 INJECTION, SOLUTION INTRAVENOUS at 07:25

## 2024-05-08 ASSESSMENT — PAIN DESCRIPTION - DESCRIPTORS: DESCRIPTORS: SHARP;DULL;ACHING

## 2024-05-08 ASSESSMENT — COLUMBIA-SUICIDE SEVERITY RATING SCALE - C-SSRS
6. HAVE YOU EVER DONE ANYTHING, STARTED TO DO ANYTHING, OR PREPARED TO DO ANYTHING TO END YOUR LIFE?: NO
1. IN THE PAST MONTH, HAVE YOU WISHED YOU WERE DEAD OR WISHED YOU COULD GO TO SLEEP AND NOT WAKE UP?: NO
2. HAVE YOU ACTUALLY HAD ANY THOUGHTS OF KILLING YOURSELF?: NO

## 2024-05-08 ASSESSMENT — PAIN DESCRIPTION - DIRECTION: RADIATING_TOWARDS: BACK

## 2024-05-08 ASSESSMENT — LIFESTYLE VARIABLES
TOTAL SCORE: 0
HAVE PEOPLE ANNOYED YOU BY CRITICIZING YOUR DRINKING: NO
HAVE YOU EVER FELT YOU SHOULD CUT DOWN ON YOUR DRINKING: NO
EVER HAD A DRINK FIRST THING IN THE MORNING TO STEADY YOUR NERVES TO GET RID OF A HANGOVER: NO
EVER FELT BAD OR GUILTY ABOUT YOUR DRINKING: NO

## 2024-05-08 ASSESSMENT — PAIN DESCRIPTION - PAIN TYPE: TYPE: ACUTE PAIN

## 2024-05-08 ASSESSMENT — PAIN DESCRIPTION - PROGRESSION: CLINICAL_PROGRESSION: GRADUALLY WORSENING

## 2024-05-08 ASSESSMENT — PAIN SCALES - GENERAL: PAINLEVEL_OUTOF10: 6

## 2024-05-08 ASSESSMENT — PAIN DESCRIPTION - FREQUENCY: FREQUENCY: CONSTANT/CONTINUOUS

## 2024-05-08 ASSESSMENT — PAIN DESCRIPTION - LOCATION: LOCATION: ABDOMEN

## 2024-05-08 ASSESSMENT — PAIN - FUNCTIONAL ASSESSMENT: PAIN_FUNCTIONAL_ASSESSMENT: 0-10

## 2024-05-08 NOTE — DISCHARGE INSTRUCTIONS
Please return to the ER or seek immediate medical attention if you experience new or worsening abdominal pain, persistent vomiting, persistent diarrhea, black tar stools, fever of 38C (100.4) or higher, chest pain, shortness of breath, or worsening of your current symptoms.    You are welcome back any time. Thank you for entrusting your care to us, I hope we made your visit as pleasant as possible. Wishing you well!    Dr. Tompkins

## 2024-05-08 NOTE — ED PROVIDER NOTES
HPI   Chief Complaint   Patient presents with    Abdominal Pain    Back Pain     Radiates to the back       23-year-old female presenting to ED for abdominal pain ongoing over the last several months ever since a .  It is intermittent, cramping associated nausea and intermittent diarrhea.  Today this morning, she reported that she had 2 episodes of bright red blood per rectum without associated abdominal pain during that time, had bright red blood in the toilet, feeling lightheaded and feeling like she is passing out.  She did not fall from then, this was witnessed by , preventing her from falling.    At time of exam, she reports that the pain is in bilateral lower quadrants and cramping sensation this been persistent since .                          No data recorded                   Patient History   Past Medical History:   Diagnosis Date    Allergic rhinitis 2023    H/O left wrist surgery     Impacted cerumen of right ear 2023    Irritable bowel syndrome (IBS) 2023    Pre-eclampsia in third trimester ([facility]) 2023    Sore throat 2023    TIA (transient ischemic attack)     Tucker teeth extracted      Past Surgical History:   Procedure Laterality Date    MR HEAD ANGIO WO IV CONTRAST  2021    MR HEAD ANGIO WO IV CONTRAST 2021 [facility]    MR NECK ANGIO WO IV CONTRAST  2021    MR NECK ANGIO WO IV CONTRAST 2021 [facility]    OTHER SURGICAL HISTORY  2020    Wrist surgery     Family History   Problem Relation Name Age of Onset    Osteoporosis Father      Anxiety disorder Son      Stroke Other grandparent     Diabetes Other grandparent      Social History     Tobacco Use    Smoking status: Never    Smokeless tobacco: Never   Vaping Use    Vaping status: Never Used   Substance Use Topics    Alcohol use: Not Currently    Drug use: Never       Physical Exam   ED Triage Vitals [24 0548]   Temperature Heart Rate  Respirations BP   36.2 °C (97.2 °F) 71 18 136/63      Pulse Ox Temp Source Heart Rate Source Patient Position   98 % Tympanic Monitor Sitting      BP Location FiO2 (%)     Right arm --       Physical Exam  Constitutional:       Appearance: Normal appearance.   HENT:      Head: Normocephalic and atraumatic.      Mouth/Throat:      Mouth: Mucous membranes are moist.   Eyes:      Extraocular Movements: Extraocular movements intact.   Cardiovascular:      Rate and Rhythm: Normal rate and regular rhythm.      Heart sounds: Normal heart sounds. No murmur heard.  Pulmonary:      Effort: Pulmonary effort is normal. No respiratory distress.      Breath sounds: Normal breath sounds. No wheezing.   Abdominal:      General: There is no distension.      Palpations: Abdomen is soft.      Tenderness: There is no abdominal tenderness. There is no guarding.   Genitourinary:     Comments: RECTAL EXAM: Consent obtained. Nurse Chantel Byers in the room for exam. Normal rectal tone. Anal wink present. Small amount of stool in the rectal vault.  No gross blood    Musculoskeletal:      Right lower leg: No edema.      Left lower leg: No edema.   Skin:     General: Skin is warm and dry.   Neurological:      General: No focal deficit present.      Mental Status: She is alert and oriented to person, place, and time.   Psychiatric:         Mood and Affect: Mood normal.         Behavior: Behavior normal.         ED Course & MDM   ED Course as of 05/08/24 2030   Wed May 08, 2024   0642 Urinalysis with Reflex Culture and Microscopic(!)  No nitrate leukocyte esterase or bacteria to suggest UTI [CB]   0642 CBC with Differential(!)  Slight leukocytosis without left shift, no acute thrombocytosis, thrombocytopenia, or anemia [CB]   0656 HCG, Urine: NEGATIVE  Not pregnant [CB]   0724 Lipase  Not greater than 3 times upper normal limit [CB]   0724 Comprehensive Metabolic Panel(!)  Normoglycemic, no acute electrolyte or hepatorenal abnormality.  Potassium markedly hemolyzed.  [CB]   6630 CT abdomen pelvis w IV contrast  Mild prominence of the walls of the colon may relate to incomplete  distention. Colitis felt less likely but not excluded.  2.4 cm right ovarian cyst/follicle.  Trace fluid in the pelvis which is most likely physiologic.  Nonobstructive nephrolithiasis.   [CB]   2081 Shared decision making for disposition  Patient and/or patient´s representative was counseled regarding labs, imaging, likely diagnosis. All questions were answered. Recommendation was made   for discharge home. The patient agreed and was discharged home in stable condition with appropriate relevant educational materials. Return precautions were provided which included new or worsening abdominal pain, persistent vomiting, persistent diarrhea, black tar stools, fever of 38C (100.4) or higher, chest pain, shortness of breath, or worsening of your current symptoms..    [CB]      ED Course User Index  [CB] Tomas Tompkins DO         Diagnoses as of 23   Colitis       Medical Decision Making  Patient presented to the ED with intermittent diarrhea and abdominal pain ongoing for several weeks to months after .  Abdominal exam is benign rectal exam does not reveal any presence of blood.  Will obtain basic laboratory studies, and plan on obtaining CT abdomen/pelvis to evaluate for intra-abdominal cause of her abdominal pain.  We are pending laboratory studies as well.    The patient is signed out to oncoming resident physician Dr. Tompkins pending laboratory and imaging workup    Discussed with the attending  Vishal Polk DO PGY-4  Emergency Medicine        Procedure  Procedures     Vishal Polk DO  Resident  24

## 2024-05-09 ENCOUNTER — OFFICE VISIT (OUTPATIENT)
Dept: PRIMARY CARE | Facility: CLINIC | Age: 24
End: 2024-05-09
Payer: COMMERCIAL

## 2024-05-09 VITALS
DIASTOLIC BLOOD PRESSURE: 74 MMHG | OXYGEN SATURATION: 99 % | HEART RATE: 92 BPM | HEIGHT: 66 IN | RESPIRATION RATE: 14 BRPM | SYSTOLIC BLOOD PRESSURE: 108 MMHG | BODY MASS INDEX: 22.66 KG/M2 | WEIGHT: 141 LBS

## 2024-05-09 DIAGNOSIS — R10.9 ABDOMINAL CRAMPING: ICD-10-CM

## 2024-05-09 DIAGNOSIS — K52.9 COLITIS: ICD-10-CM

## 2024-05-09 DIAGNOSIS — K92.1 HEMATOCHEZIA: Primary | ICD-10-CM

## 2024-05-09 DIAGNOSIS — R11.0 NAUSEA: ICD-10-CM

## 2024-05-09 LAB — BACTERIA UR CULT: NO GROWTH

## 2024-05-09 PROCEDURE — 99214 OFFICE O/P EST MOD 30 MIN: CPT | Performed by: INTERNAL MEDICINE

## 2024-05-09 ASSESSMENT — ENCOUNTER SYMPTOMS
COUGH: 0
DIARRHEA: 0
MYALGIAS: 0
NAUSEA: 1
FEVER: 0
DIAPHORESIS: 0
SHORTNESS OF BREATH: 0
CONSTIPATION: 0
JOINT SWELLING: 0
ABDOMINAL PAIN: 1
VOMITING: 0
CHILLS: 0
BLOOD IN STOOL: 1

## 2024-05-09 NOTE — PROGRESS NOTES
"Katerin Costa is a 23 y.o. female who presents for  ER FOLLOW UP RECTAL BLEEDING, STOMACH PAINS    NO ANSWERS GIVEN AT ER   PT SAYS THAT IT FEELS LIKE SHE'S GOING TO HAVE A BM AND BLOOD COMES OUT   STARTED OVER 24 HRS AGO   WAS GIVEN AMOXICILLIN TOOK 1 DOSE     HPI   TUES NIGHT HAD STOMACH CRAMPS AND DIARRHEA, HAD SOME BLOOD    FEELING CRAMPY AND NAUSEATED, HARD TO EAT EVEN SOUP.    STILL HAVING SQUIRTS OF BLOOD.    FEEL LIKE HAVE TO HAVE BM AND THEN WHEN GO A SQUIRT OF BLOOD COMES OUT    THOUGHT LIKE TISSUES WAS COMING OUT OF HER, LOOKED LIKE BLEEDING FROM HER PERIOD.    HD RECTAL EXAM IN THE ER, WAS TOLD NO HEMORRHOIDS.    ER NOTES, LABS, AND IMAGING ARE REVIEWED IN DETAIL      Review of Systems   Constitutional:  Negative for chills, diaphoresis and fever.   Respiratory:  Negative for cough and shortness of breath.    Cardiovascular:  Negative for chest pain and leg swelling.   Gastrointestinal:  Positive for abdominal pain, blood in stool and nausea. Negative for constipation, diarrhea and vomiting.   Musculoskeletal:  Negative for joint swelling and myalgias.       Objective   /74   Pulse 92   Resp 14   Ht 1.676 m (5' 6\")   Wt 64 kg (141 lb)   SpO2 99%   BMI 22.76 kg/m²     Physical Exam  Vitals reviewed.   Constitutional:       General: She is not in acute distress.     Appearance: She is not ill-appearing.   Cardiovascular:      Rate and Rhythm: Normal rate and regular rhythm.      Pulses: Normal pulses.      Heart sounds:      No gallop.   Pulmonary:      Breath sounds: Normal breath sounds. No wheezing, rhonchi or rales.   Abdominal:      General: Abdomen is flat. Bowel sounds are normal. There is no distension.      Palpations: Abdomen is soft. There is no mass.      Tenderness: There is abdominal tenderness. There is no guarding or rebound.      Comments: NO GUARDING OR REBOUND, HOWEVER IS TENDER TO DEEP PALPATION LEFT ABDOMEN AND LEFT LOWER QUADRANT   Musculoskeletal:      " Right lower leg: No edema.      Left lower leg: No edema.         Assessment/Plan   Problem List Items Addressed This Visit       Hematochezia - Primary    Abdominal cramping    Nausea    Colitis     Patient Instructions    I SUSPECT THAT YOU HAVE EXPERIENCED A RANDOM EVENT CALLED ISCHEMIC COLITIS, WHICH IS TYPICALLY A SELF-LIMITED SITUATION, IS CAUSED BY THE TRANSIENT INTERRUPTION OF BLOOD FLOW (FOR REASONS WE OFTEN TIME DON'T KNOW) THAT CAUSES THE INNER SKIN OF THE COLON TO BECOME COMPROMISED.  IT IS ONE OF THE MORE COMMON CAUSES OF PAINFUL HEMATOCHEZIA (RED BLOOD COMING OUT)    2.  THE COURSE IS USUALLY SELF LIMITED AND GETS BETTER RELATIVELY QUICKLY.  ANTIBIOTICS LIKE AUGMENTIN CAN HELP KEEP THE INNER COLON CLEAN AS THE SKIN REBUILDS ITSELF WHICH AGAIN HAPPENS RAPIDLY.    3.  AS LONG AS THE AMOUNT OF BLOOD THAT IS COMING OUT IS SMALL LIKE WHAT YOU SHOWED ME, YOU ARE OK.  IF YOU FILL THE BOWL WITH BLOOD, THEN YOU NEED TO GO BACK TO THE ER, I THINK THIS POSSIBILITY UNLIKELY    4.  BRAT DIET AND STAYING WELL HYDRATED IN THE MEANTIME CAN HELP IT HEAL FASTER.  THE NAUSEA AND CRAMPY PAIN SHOULD IMPROVE AS THIS HEALS UP    5.  SINCE A GI DOCTOR WILL NOT DO A COLONOSCOPY FOR COLITIS OF THIS NATURE UNTIL IT IS HEALED, WILL LET YOU TRY TO HEAL UP OVER THE NEXT 3-4 DAYS.  IF YOU ARE GETTING NO BETTER BY MONDAY, CALL BACK AND I'LLL HAVE YOU SEEN QUICKLY, OTHERWISE OK TO WAIT TO SEE JULY FOR A FEW MONTHS    6.  AVOID ADVIL/ALEVE/ASPIRIN, TYLENOL OK FOR DISCOMFORT.  AVOID IMMODIUM OR BOWEL SLOWING AGENTS.    7.  FOLLOW UP IF NEEDED.

## 2024-05-09 NOTE — PATIENT INSTRUCTIONS
I SUSPECT THAT YOU HAVE EXPERIENCED A RANDOM EVENT CALLED ISCHEMIC COLITIS, WHICH IS TYPICALLY A SELF-LIMITED SITUATION, IS CAUSED BY THE TRANSIENT INTERRUPTION OF BLOOD FLOW (FOR REASONS WE OFTEN TIME DON'T KNOW) THAT CAUSES THE INNER SKIN OF THE COLON TO BECOME COMPROMISED.  IT IS ONE OF THE MORE COMMON CAUSES OF PAINFUL HEMATOCHEZIA (RED BLOOD COMING OUT)    2.  THE COURSE IS USUALLY SELF LIMITED AND GETS BETTER RELATIVELY QUICKLY.  ANTIBIOTICS LIKE AUGMENTIN CAN HELP KEEP THE INNER COLON CLEAN AS THE SKIN REBUILDS ITSELF WHICH AGAIN HAPPENS RAPIDLY.    3.  AS LONG AS THE AMOUNT OF BLOOD THAT IS COMING OUT IS SMALL LIKE WHAT YOU SHOWED ME, YOU ARE OK.  IF YOU FILL THE BOWL WITH BLOOD, THEN YOU NEED TO GO BACK TO THE ER, I THINK THIS POSSIBILITY UNLIKELY    4.  BRAT DIET AND STAYING WELL HYDRATED IN THE MEANTIME CAN HELP IT HEAL FASTER.  THE NAUSEA AND CRAMPY PAIN SHOULD IMPROVE AS THIS HEALS UP    5.  SINCE A GI DOCTOR WILL NOT DO A COLONOSCOPY FOR COLITIS OF THIS NATURE UNTIL IT IS HEALED, WILL LET YOU TRY TO HEAL UP OVER THE NEXT 3-4 DAYS.  IF YOU ARE GETTING NO BETTER BY MONDAY, CALL BACK AND I'LLL HAVE YOU SEEN QUICKLY, OTHERWISE OK TO WAIT TO SEE JULY FOR A FEW MONTHS    6.  AVOID ADVIL/ALEVE/ASPIRIN, TYLENOL OK FOR DISCOMFORT.  AVOID IMMODIUM OR BOWEL SLOWING AGENTS.    7.  FOLLOW UP IF NEEDED.

## 2024-05-16 ENCOUNTER — APPOINTMENT (OUTPATIENT)
Dept: PRIMARY CARE | Facility: CLINIC | Age: 24
End: 2024-05-16
Payer: COMMERCIAL

## 2024-05-20 ENCOUNTER — OFFICE VISIT (OUTPATIENT)
Dept: PRIMARY CARE | Facility: CLINIC | Age: 24
End: 2024-05-20
Payer: COMMERCIAL

## 2024-05-20 VITALS
SYSTOLIC BLOOD PRESSURE: 102 MMHG | HEIGHT: 66 IN | BODY MASS INDEX: 22.76 KG/M2 | DIASTOLIC BLOOD PRESSURE: 70 MMHG | HEART RATE: 67 BPM | OXYGEN SATURATION: 98 % | RESPIRATION RATE: 14 BRPM

## 2024-05-20 DIAGNOSIS — F34.1 DYSTHYMIA: ICD-10-CM

## 2024-05-20 DIAGNOSIS — K52.9 COLITIS: Primary | ICD-10-CM

## 2024-05-20 DIAGNOSIS — K92.1 HEMATOCHEZIA: ICD-10-CM

## 2024-05-20 PROCEDURE — 1036F TOBACCO NON-USER: CPT | Performed by: INTERNAL MEDICINE

## 2024-05-20 PROCEDURE — 99213 OFFICE O/P EST LOW 20 MIN: CPT | Performed by: INTERNAL MEDICINE

## 2024-05-20 RX ORDER — ESCITALOPRAM OXALATE 10 MG/1
10 TABLET ORAL DAILY
Qty: 90 TABLET | Refills: 3 | Status: SHIPPED | OUTPATIENT
Start: 2024-05-20

## 2024-05-20 ASSESSMENT — ENCOUNTER SYMPTOMS
MYALGIAS: 0
DIAPHORESIS: 0
DIARRHEA: 0
ROS GI COMMENTS: SEE HPI
FEVER: 0
CONSTIPATION: 0
JOINT SWELLING: 0
CHILLS: 0
VOMITING: 0
COUGH: 0
SHORTNESS OF BREATH: 0
NAUSEA: 0

## 2024-05-20 NOTE — PATIENT INSTRUCTIONS
RECOMMEND INCREASE LEXAPRO TO 10 MG DAILY, STAY ON IT FOR TOTAL 6-9 MONTHS IS IDEAL, SO IF EVERYTHING GOES WELL CONSIDER TITRATE OFF OF IT IN THE SPRING 2025    2.  MOST LIKELY YOU EXPERIENCED AN ISCHEMIC COLITIS (PAINFUL HEMATOCHEZIA), AND POSSIBLY RELATED TO THE POST-PARTUM STATE.  HOWEVER, IN ORDER TO EXCLUDE THE VERY UNLIKELY POSSIBILITY THAT WHAT YOU EXPERIENCED REPRESENTS A DIFFERENT KIND OF COLITIS CALLED ULCERATIVE COLITIS, THEN REFERRAL  HAS ALREADY BEEN MADE TO GI (DR. TREJO) TO CONSIDER COLONOSCOPY    3.  CALL BACK IF THE RE-OCCURS, EVEN A LITTLE, AND I'LL GET YOU SEEN SOONER.    4.  FOLLOW UP YEARLY OR AS NEEDED.

## 2024-05-20 NOTE — PROGRESS NOTES
"Subjective   Dary Costa is a 23 y.o. female who presents for FOLLOW UP    THINKS MAKE NEED A INCREASE WITH LEXAPRO     HPI   LEXAPRO HELPED A GREAT DEAL, VERY FEW RAGES SINCE STARTING    WANT SLIGHT HIGHER DOSEAGE    ABD PAIN HAS RECEDED TO ALMOST GONE.  BOWELS CLOSE TO NORMAL.  STILL SOME CRAMPING, NO MORE HEMATOCHEZIA    FAMILY WORIED ABOUT HER RE GI SITUATION    Review of Systems   Constitutional:  Negative for chills, diaphoresis and fever.   Respiratory:  Negative for cough and shortness of breath.    Cardiovascular:  Negative for chest pain and leg swelling.   Gastrointestinal:  Negative for constipation, diarrhea, nausea and vomiting.        SEE HPI   Musculoskeletal:  Negative for joint swelling and myalgias.       Objective   /70   Pulse 67   Resp 14   Ht 1.676 m (5' 6\")   SpO2 98%   BMI 22.76 kg/m²     Physical Exam  Vitals reviewed.   Constitutional:       General: She is not in acute distress.     Appearance: She is not ill-appearing.   Cardiovascular:      Rate and Rhythm: Normal rate and regular rhythm.      Pulses: Normal pulses.      Heart sounds:      No gallop.   Pulmonary:      Breath sounds: Normal breath sounds. No wheezing, rhonchi or rales.   Abdominal:      General: Abdomen is flat. Bowel sounds are normal. There is no distension.      Palpations: Abdomen is soft.      Tenderness: There is no abdominal tenderness. There is no guarding or rebound.      Comments: NO TENDERNESS ELICITED TO DEEP RLQ PALPATION   Musculoskeletal:      Right lower leg: No edema.      Left lower leg: No edema.   Psychiatric:         Mood and Affect: Mood normal.         Behavior: Behavior normal.         Assessment/Plan   Problem List Items Addressed This Visit       Hematochezia    Colitis - Primary     Other Visit Diagnoses       Dysthymia        Relevant Medications    escitalopram (Lexapro) 10 mg tablet          Patient Instructions    RECOMMEND INCREASE LEXAPRO TO 10 MG DAILY, STAY ON IT FOR " TOTAL 6-9 MONTHS IS IDEAL, SO IF EVERYTHING GOES WELL CONSIDER TITRATE OFF OF IT IN THE SPRING 2025    2.  MOST LIKELY YOU EXPERIENCED AN ISCHEMIC COLITIS (PAINFUL HEMATOCHEZIA), AND POSSIBLY RELATED TO THE POST-PARTUM STATE.  HOWEVER, IN ORDER TO EXCLUDE THE VERY UNLIKELY POSSIBILITY THAT WHAT YOU EXPERIENCED REPRESENTS A DIFFERENT KIND OF COLITIS CALLED ULCERATIVE COLITIS, THEN REFERRAL  HAS ALREADY BEEN MADE TO GI (DR. TREJO) TO CONSIDER COLONOSCOPY    3.  CALL BACK IF THE RE-OCCURS, EVEN A LITTLE, AND I'LL GET YOU SEEN SOONER.    4.  FOLLOW UP YEARLY OR AS NEEDED.

## 2024-06-11 ENCOUNTER — APPOINTMENT (OUTPATIENT)
Dept: PRIMARY CARE | Facility: CLINIC | Age: 24
End: 2024-06-11
Payer: COMMERCIAL

## 2024-07-02 DIAGNOSIS — K52.9 COLITIS: Primary | ICD-10-CM

## 2024-07-02 RX ORDER — AMOXICILLIN AND CLAVULANATE POTASSIUM 875; 125 MG/1; MG/1
875 TABLET, FILM COATED ORAL 2 TIMES DAILY
Qty: 14 TABLET | Refills: 0 | Status: SHIPPED | OUTPATIENT
Start: 2024-07-02 | End: 2024-07-08 | Stop reason: WASHOUT

## 2024-07-07 NOTE — PROGRESS NOTES
CC: Hematochezia, abdominal pain, altered BMs.     History of Present Illness:   Dary Costa is a 23 y.o. female with a PMH of IBS, TIA, anemia, anxiety who presents to clinic for abdominal pain, diarrhea, and hematochezia.  Patient states that she has 1 episode of diarrhea weekly.  Is been like this for years.  She did have a couple year period where she had constipation.  Recently, she has had severe abdominal cramping that leads to hematochezia.  This has happened twice.  Lots of bleeding that will last the entire day.  Cramping feels as bad as contractions.  She received antibiotics for it. CT A/P 5/2024: Bowel wall thickening, suspect incomplete distension of the colon, no other GI findings.  No family history of ulcerative colitis.  She is on no other medications besides Lexapro.    Review of Systems  ROS Negative unless otherwise stated above.    Past Medical/Surgical History  Past Medical History:   Diagnosis Date    Allergic rhinitis 02/28/2023    H/O left wrist surgery     Impacted cerumen of right ear 02/28/2023    Irritable bowel syndrome (IBS) 02/28/2023    Pre-eclampsia in third trimester ([facility]) 12/09/2023    Sore throat 02/28/2023    TIA (transient ischemic attack) 2021    [place] teeth extracted       Past Surgical History:   Procedure Laterality Date    MR HEAD ANGIO WO IV CONTRAST  1/18/2021    MR HEAD ANGIO WO IV CONTRAST 1/18/2021 [facility]    MR NECK ANGIO WO IV CONTRAST  1/18/2021    MR NECK ANGIO WO IV CONTRAST 1/18/2021 [facility]    OTHER SURGICAL HISTORY  03/05/2020    Wrist surgery        Social History   reports that she has never smoked. She has never used smokeless tobacco. She reports that she does not currently use alcohol. She reports that she does not use drugs.     Family History  family history includes Anxiety disorder in her son; Diabetes in an other family member; Osteoporosis in her father; Stroke in an other family member.     Allergies  No Known  Allergies    Medications  Current Outpatient Medications   Medication Instructions    escitalopram (LEXAPRO) 10 mg, oral, Daily        Objective   Visit Vitals  /73   Pulse 106   Resp 16        General: A&Ox3, NAD.  HEENT: AT/NC.   CV: RRR. No murmur.  Resp: CTA bilaterally. No wheezing, rhonchi or rales.   GI: Soft, NT/ND.   Extrem: No edema. Pulses intact.  Skin: No Jaundice.   Neuro: No focal deficits.   Psych: Normal mood and affect.     Lab Results   Component Value Date    WBC 12.6 (H) 05/08/2024    HGB 13.1 05/08/2024    HCT 39.7 05/08/2024    MCV 91 05/08/2024     05/08/2024       Chemistry    Lab Results   Component Value Date/Time     05/08/2024 0629    K 5.8 (H) 05/08/2024 0629     05/08/2024 0629    CO2 25 05/08/2024 0629    BUN 17 05/08/2024 0629    CREATININE 0.65 05/08/2024 0629    Lab Results   Component Value Date/Time    CALCIUM 9.8 05/08/2024 0629    ALKPHOS 105 05/08/2024 0629    AST 35 05/08/2024 0629    ALT 16 05/08/2024 0629    BILITOT 0.3 05/08/2024 0629             ASSESSMENT/PLAN  Dary Costa is a 23 y.o. female with a PMH of IBS, TIA, anemia, anxiety who presents to clinic for abdominal pain, diarrhea, and hematochezia.     -Obtain stool infectious studies.  -Obtain fecal calprotectin.  -Obtain celiac studies, basic labs, CRP, ESR.  -Obtain T spot and hepatitis B studies in case this is IBD.  -Obtain colonoscopy.      Kendall Flores DO

## 2024-07-08 ENCOUNTER — LAB (OUTPATIENT)
Dept: LAB | Facility: LAB | Age: 24
End: 2024-07-08
Payer: COMMERCIAL

## 2024-07-08 ENCOUNTER — APPOINTMENT (OUTPATIENT)
Dept: GASTROENTEROLOGY | Facility: CLINIC | Age: 24
End: 2024-07-08
Payer: COMMERCIAL

## 2024-07-08 VITALS
OXYGEN SATURATION: 98 % | RESPIRATION RATE: 16 BRPM | BODY MASS INDEX: 21.38 KG/M2 | HEIGHT: 66 IN | WEIGHT: 133 LBS | DIASTOLIC BLOOD PRESSURE: 73 MMHG | SYSTOLIC BLOOD PRESSURE: 105 MMHG | HEART RATE: 106 BPM

## 2024-07-08 DIAGNOSIS — R19.7 DIARRHEA, UNSPECIFIED TYPE: ICD-10-CM

## 2024-07-08 DIAGNOSIS — R10.9 ABDOMINAL PAIN, UNSPECIFIED ABDOMINAL LOCATION: ICD-10-CM

## 2024-07-08 DIAGNOSIS — K92.1 HEMATOCHEZIA: ICD-10-CM

## 2024-07-08 DIAGNOSIS — R19.7 DIARRHEA, UNSPECIFIED TYPE: Primary | ICD-10-CM

## 2024-07-08 LAB
ALBUMIN SERPL BCP-MCNC: 4.9 G/DL (ref 3.4–5)
ALP SERPL-CCNC: 111 U/L (ref 33–110)
ALT SERPL W P-5'-P-CCNC: 11 U/L (ref 7–45)
ANION GAP SERPL CALC-SCNC: 11 MMOL/L (ref 10–20)
AST SERPL W P-5'-P-CCNC: 12 U/L (ref 9–39)
BASOPHILS # BLD AUTO: 0.09 X10*3/UL (ref 0–0.1)
BASOPHILS NFR BLD AUTO: 1.2 %
BILIRUB SERPL-MCNC: 0.5 MG/DL (ref 0–1.2)
BUN SERPL-MCNC: 14 MG/DL (ref 6–23)
CALCIUM SERPL-MCNC: 10.2 MG/DL (ref 8.6–10.3)
CHLORIDE SERPL-SCNC: 103 MMOL/L (ref 98–107)
CO2 SERPL-SCNC: 31 MMOL/L (ref 21–32)
CREAT SERPL-MCNC: 0.75 MG/DL (ref 0.5–1.05)
CRP SERPL-MCNC: 0.19 MG/DL
EGFRCR SERPLBLD CKD-EPI 2021: >90 ML/MIN/1.73M*2
EOSINOPHIL # BLD AUTO: 0.08 X10*3/UL (ref 0–0.7)
EOSINOPHIL NFR BLD AUTO: 1.1 %
ERYTHROCYTE [DISTWIDTH] IN BLOOD BY AUTOMATED COUNT: 11.8 % (ref 11.5–14.5)
ERYTHROCYTE [SEDIMENTATION RATE] IN BLOOD BY WESTERGREN METHOD: 7 MM/H (ref 0–20)
GLUCOSE SERPL-MCNC: 91 MG/DL (ref 74–99)
HBV SURFACE AB SER-ACNC: <3.1 MIU/ML
HBV SURFACE AG SERPL QL IA: NONREACTIVE
HCT VFR BLD AUTO: 42.5 % (ref 36–46)
HGB BLD-MCNC: 14 G/DL (ref 12–16)
IGA SERPL-MCNC: 264 MG/DL (ref 70–400)
IMM GRANULOCYTES # BLD AUTO: 0.01 X10*3/UL (ref 0–0.7)
IMM GRANULOCYTES NFR BLD AUTO: 0.1 % (ref 0–0.9)
LYMPHOCYTES # BLD AUTO: 2.4 X10*3/UL (ref 1.2–4.8)
LYMPHOCYTES NFR BLD AUTO: 31.7 %
MCH RBC QN AUTO: 29.4 PG (ref 26–34)
MCHC RBC AUTO-ENTMCNC: 32.9 G/DL (ref 32–36)
MCV RBC AUTO: 89 FL (ref 80–100)
MONOCYTES # BLD AUTO: 0.44 X10*3/UL (ref 0.1–1)
MONOCYTES NFR BLD AUTO: 5.8 %
NEUTROPHILS # BLD AUTO: 4.55 X10*3/UL (ref 1.2–7.7)
NEUTROPHILS NFR BLD AUTO: 60.1 %
NRBC BLD-RTO: 0 /100 WBCS (ref 0–0)
PLATELET # BLD AUTO: 309 X10*3/UL (ref 150–450)
POTASSIUM SERPL-SCNC: 4.3 MMOL/L (ref 3.5–5.3)
PROT SERPL-MCNC: 7.7 G/DL (ref 6.4–8.2)
RBC # BLD AUTO: 4.76 X10*6/UL (ref 4–5.2)
SODIUM SERPL-SCNC: 141 MMOL/L (ref 136–145)
TTG IGA SER IA-ACNC: <1 U/ML
WBC # BLD AUTO: 7.6 X10*3/UL (ref 4.4–11.3)

## 2024-07-08 PROCEDURE — 82784 ASSAY IGA/IGD/IGG/IGM EACH: CPT

## 2024-07-08 PROCEDURE — 1036F TOBACCO NON-USER: CPT | Performed by: STUDENT IN AN ORGANIZED HEALTH CARE EDUCATION/TRAINING PROGRAM

## 2024-07-08 PROCEDURE — 85652 RBC SED RATE AUTOMATED: CPT

## 2024-07-08 PROCEDURE — 86140 C-REACTIVE PROTEIN: CPT

## 2024-07-08 PROCEDURE — 83516 IMMUNOASSAY NONANTIBODY: CPT

## 2024-07-08 PROCEDURE — 36415 COLL VENOUS BLD VENIPUNCTURE: CPT

## 2024-07-08 PROCEDURE — 85025 COMPLETE CBC W/AUTO DIFF WBC: CPT

## 2024-07-08 PROCEDURE — 86706 HEP B SURFACE ANTIBODY: CPT

## 2024-07-08 PROCEDURE — 80053 COMPREHEN METABOLIC PANEL: CPT

## 2024-07-08 PROCEDURE — 86481 TB AG RESPONSE T-CELL SUSP: CPT

## 2024-07-08 PROCEDURE — 99204 OFFICE O/P NEW MOD 45 MIN: CPT | Performed by: STUDENT IN AN ORGANIZED HEALTH CARE EDUCATION/TRAINING PROGRAM

## 2024-07-08 PROCEDURE — 87340 HEPATITIS B SURFACE AG IA: CPT

## 2024-07-10 LAB
NIL(NEG) CONTROL SPOT COUNT: NORMAL
PANEL A SPOT COUNT: 0
PANEL B SPOT COUNT: 0
POS CONTROL SPOT COUNT: NORMAL
T-SPOT. TB INTERPRETATION: NEGATIVE

## 2024-07-12 ENCOUNTER — ANESTHESIA EVENT (OUTPATIENT)
Dept: GASTROENTEROLOGY | Facility: EXTERNAL LOCATION | Age: 24
End: 2024-07-12

## 2024-07-12 ENCOUNTER — LAB (OUTPATIENT)
Dept: LAB | Facility: LAB | Age: 24
End: 2024-07-12
Payer: COMMERCIAL

## 2024-07-12 DIAGNOSIS — R19.7 DIARRHEA, UNSPECIFIED TYPE: ICD-10-CM

## 2024-07-12 DIAGNOSIS — R10.9 ABDOMINAL PAIN, UNSPECIFIED ABDOMINAL LOCATION: ICD-10-CM

## 2024-07-12 DIAGNOSIS — K92.1 HEMATOCHEZIA: ICD-10-CM

## 2024-07-12 PROCEDURE — 83993 ASSAY FOR CALPROTECTIN FECAL: CPT

## 2024-07-12 PROCEDURE — 87506 IADNA-DNA/RNA PROBE TQ 6-11: CPT

## 2024-07-12 PROCEDURE — 87329 GIARDIA AG IA: CPT

## 2024-07-12 PROCEDURE — 87328 CRYPTOSPORIDIUM AG IA: CPT

## 2024-07-14 LAB

## 2024-07-16 LAB
CRYPTOSP AG STL QL IA: NEGATIVE
G LAMBLIA AG STL QL IA: NEGATIVE

## 2024-07-17 LAB
CALPROTECTIN STL-MCNT: 69 UG/G
O+P STL MICRO: NEGATIVE

## 2024-07-23 ENCOUNTER — APPOINTMENT (OUTPATIENT)
Dept: GASTROENTEROLOGY | Facility: EXTERNAL LOCATION | Age: 24
End: 2024-07-23
Payer: COMMERCIAL

## 2024-07-23 ENCOUNTER — ANESTHESIA (OUTPATIENT)
Dept: GASTROENTEROLOGY | Facility: EXTERNAL LOCATION | Age: 24
End: 2024-07-23

## 2024-07-23 VITALS
HEIGHT: 66 IN | RESPIRATION RATE: 16 BRPM | HEART RATE: 77 BPM | TEMPERATURE: 98.2 F | DIASTOLIC BLOOD PRESSURE: 70 MMHG | SYSTOLIC BLOOD PRESSURE: 110 MMHG | BODY MASS INDEX: 21.38 KG/M2 | WEIGHT: 133 LBS | OXYGEN SATURATION: 99 %

## 2024-07-23 DIAGNOSIS — R10.9 ABDOMINAL PAIN, UNSPECIFIED ABDOMINAL LOCATION: ICD-10-CM

## 2024-07-23 DIAGNOSIS — R19.7 DIARRHEA, UNSPECIFIED TYPE: ICD-10-CM

## 2024-07-23 DIAGNOSIS — K92.1 HEMATOCHEZIA: ICD-10-CM

## 2024-07-23 LAB — PREGNANCY TEST URINE, POC: NEGATIVE

## 2024-07-23 PROCEDURE — 45380 COLONOSCOPY AND BIOPSY: CPT | Performed by: STUDENT IN AN ORGANIZED HEALTH CARE EDUCATION/TRAINING PROGRAM

## 2024-07-23 PROCEDURE — 88305 TISSUE EXAM BY PATHOLOGIST: CPT | Mod: TC,ELYLAB | Performed by: STUDENT IN AN ORGANIZED HEALTH CARE EDUCATION/TRAINING PROGRAM

## 2024-07-23 RX ORDER — PROPOFOL 10 MG/ML
INJECTION, EMULSION INTRAVENOUS AS NEEDED
Status: DISCONTINUED | OUTPATIENT
Start: 2024-07-23 | End: 2024-07-23

## 2024-07-23 RX ORDER — SODIUM CHLORIDE 9 MG/ML
20 INJECTION, SOLUTION INTRAVENOUS CONTINUOUS
Status: DISCONTINUED | OUTPATIENT
Start: 2024-07-23 | End: 2024-07-24 | Stop reason: HOSPADM

## 2024-07-23 RX ORDER — LIDOCAINE HYDROCHLORIDE 20 MG/ML
INJECTION, SOLUTION INFILTRATION; PERINEURAL AS NEEDED
Status: DISCONTINUED | OUTPATIENT
Start: 2024-07-23 | End: 2024-07-23

## 2024-07-23 RX ORDER — MIDAZOLAM HYDROCHLORIDE 1 MG/ML
INJECTION, SOLUTION INTRAMUSCULAR; INTRAVENOUS AS NEEDED
Status: DISCONTINUED | OUTPATIENT
Start: 2024-07-23 | End: 2024-07-23

## 2024-07-23 SDOH — HEALTH STABILITY: MENTAL HEALTH: CURRENT SMOKER: 0

## 2024-07-23 ASSESSMENT — PAIN SCALES - GENERAL
PAINLEVEL_OUTOF10: 0 - NO PAIN

## 2024-07-23 ASSESSMENT — COLUMBIA-SUICIDE SEVERITY RATING SCALE - C-SSRS
6. HAVE YOU EVER DONE ANYTHING, STARTED TO DO ANYTHING, OR PREPARED TO DO ANYTHING TO END YOUR LIFE?: NO
1. IN THE PAST MONTH, HAVE YOU WISHED YOU WERE DEAD OR WISHED YOU COULD GO TO SLEEP AND NOT WAKE UP?: NO
2. HAVE YOU ACTUALLY HAD ANY THOUGHTS OF KILLING YOURSELF?: NO
6. HAVE YOU EVER DONE ANYTHING, STARTED TO DO ANYTHING, OR PREPARED TO DO ANYTHING TO END YOUR LIFE?: NO
2. HAVE YOU ACTUALLY HAD ANY THOUGHTS OF KILLING YOURSELF?: NO
1. IN THE PAST MONTH, HAVE YOU WISHED YOU WERE DEAD OR WISHED YOU COULD GO TO SLEEP AND NOT WAKE UP?: NO

## 2024-07-23 ASSESSMENT — PAIN - FUNCTIONAL ASSESSMENT
PAIN_FUNCTIONAL_ASSESSMENT: 0-10

## 2024-07-23 NOTE — ANESTHESIA PREPROCEDURE EVALUATION
Patient: Dary Costa    Procedure Information       Date/Time: 07/23/24 1500    Scheduled providers: Kendall Flores DO    Procedure: COLONOSCOPY    Location: [facility]            Relevant Problems   Anesthesia (within normal limits)      Cardiac (within normal limits)      Pulmonary (within normal limits)      Neuro   (+) Depression   (+) Eclampsia (toxemia of pregnancy) ([facility]-HCC)   (+) Generalized anxiety disorder with panic attacks      GI (within normal limits)      /Renal (within normal limits)      Liver (within normal limits)      Endocrine (within normal limits)      Hematology (within normal limits)       Clinical information reviewed:   Tobacco  Allergies  Meds   Med Hx  Surg Hx  OB Status  Fam Hx  Soc   Hx        NPO Detail:  NPO/Void Status  Date of Last Liquid: 07/23/24  Time of Last Liquid: 0800  Date of Last Solid: 07/21/24  Time of Last Solid: 1800         Physical Exam    Airway  Mallampati: II  TM distance: >3 FB  Neck ROM: full     Cardiovascular   Rhythm: regular     Dental    Pulmonary - normal exam     Abdominal            Anesthesia Plan    History of general anesthesia?: yes  History of complications of general anesthesia?: no    ASA 2     MAC     The patient is not a current smoker.    intravenous induction   Anesthetic plan and risks discussed with patient and spouse.

## 2024-07-23 NOTE — DISCHARGE INSTRUCTIONS
Patient Instructions Post Procedure      The anesthetics, sedatives or narcotics which were given to you today will be acting in your body for the next 24 hours, so you might feel a little sleepy or groggy.  This feeling should slowly wear off. Carefully read and follow the instructions.     You received sedation today:  - Do not drive or operate any machinery or power tools of any kind.   - No alcoholic beverages today, not even beer or wine.  - Do not make any important decisions or sign any legal documents.  - No over the counter medications that contain alcohol or that may cause drowsiness.    While it is common to experience mild to moderate abdominal distention, gas, or belching after your procedure, if any of these symptoms occur following discharge from the GI Lab or within one week of having your procedure, call the [facility] to be advised whether a visit to your nearest Urgent Care or Emergency Department is indicated.  Take this paper with you if you go.   - If you develop an allergic reaction to the medications that were given during your procedure such as difficulty breathing, rash, hives, severe nausea, vomiting or lightheadedness.  - If you experience chest pain, shortness of breath, severe abdominal pain, fevers and chills.  -If you develop signs and symptoms of bleeding such as blood in your spit, if your stools turn black, tarry, or bloody  - If you have not urinated within 8 hours following your procedure.  - If your IV site becomes painful, red, inflamed, or looks infected.      Your physician recommends the additional following instructions:    -You have a contact number available for emergencies. The signs and symptoms of potential delayed complications were discussed with you. You may return to normal activities tomorrow.  -Resume your previous diet or other if specified.  -Continue your present medications.   -We are waiting for your pathology results, if applicable.  -The  findings and recommendations have been discussed with you and/or family.  - Please see Medication Reconciliation Form for new medication/medications prescribed.     If you experience any problems or have any questions following discharge from the GI Lab, please call: [phone] from 7 am- 4:30 pm.  In the event of an emergency please go to the closest Emergency Department or call Dr. Flores @ [phone].

## 2024-07-23 NOTE — H&P
Outpatient Hospital Procedure H&P    Patient Profile  Name Dary Costa  Date of Birth 2000  MRN [id number]  PCP Asad Beasley        Diagnosis: Hematochezia, abdominal pain, altered BMs.   Procedure(s):  Colonoscopy.    Allergies  No Known Allergies    Past Medical History   Past Medical History:   Diagnosis Date    Allergic rhinitis 02/28/2023    Colitis     H/O left wrist surgery     Impacted cerumen of right ear 02/28/2023    Irritable bowel syndrome (IBS) 02/28/2023    Pre-eclampsia in third trimester ([facility]) 12/09/2023    Sore throat 02/28/2023    TIA (transient ischemic attack) 2021    [place] teeth extracted        Medication Reviewed - yes  Prior to Admission medications    Medication Sig Start Date End Date Taking? Authorizing Provider   escitalopram (Lexapro) 10 mg tablet Take 1 tablet (10 mg) by mouth once daily. 5/20/24   Asad Beasley MD       Physical Exam  Vitals:    07/23/24 1338   Temp: 36.5 °C (97.7 °F)      Weight   Vitals:    07/23/24 1338   Weight: 60.3 kg (133 lb)     BMI Body mass index is 21.47 kg/m².    General: A&Ox3, NAD.  HEENT: AT/NC.   CV: RRR. No murmur.  Resp: CTA bilaterally. No wheezing, rhonchi or rales.   GI: Soft, NT/ND.  Extrem: No edema.   Skin: No Jaundice.   Neuro: No focal deficits.   Psych: Normal mood and affect.        Oropharyngeal Classification II (hard and soft palate, upper portion of tonsils and uvula visible)  ASA PS Classification 2  Sedation Plan: Deep Sedation.  Procedure Plan - pre-procedural (re)assesment completed by physician:  discharge/transfer patient when discharge criteria met    Kendall Flores DO  7/23/2024 1:47 PM

## 2024-07-23 NOTE — ANESTHESIA POSTPROCEDURE EVALUATION
Patient: Dary Costa    Procedure Summary       Date: 07/23/24 Room / Location: [facility]    Anesthesia Start: 1430 Anesthesia Stop: 1450    Procedure: COLONOSCOPY Diagnosis:       Diarrhea, unspecified type      Hematochezia      Abdominal pain, unspecified abdominal location    Scheduled Providers: Kendall Flores DO Responsible Provider: GAURI Chen-CRNA    Anesthesia Type: MAC ASA Status: 2            Anesthesia Type: MAC    Vitals Value Taken Time   /70 07/23/24 1511   Temp 36.8 °C (98.2 °F) 07/23/24 1451   Pulse 77 07/23/24 1511   Resp 16 07/23/24 1511   SpO2 99 % 07/23/24 1511       Anesthesia Post Evaluation    Patient location during evaluation: PACU  Patient participation: complete - patient participated  Level of consciousness: awake and alert  Pain management: adequate  Airway patency: patent  Cardiovascular status: acceptable  Respiratory status: acceptable  Hydration status: acceptable  Postoperative Nausea and Vomiting: none        No notable events documented.

## 2024-08-02 LAB
LABORATORY COMMENT REPORT: NORMAL
PATH REPORT.FINAL DX SPEC: NORMAL
PATH REPORT.GROSS SPEC: NORMAL
PATH REPORT.TOTAL CANCER: NORMAL

## 2025-06-02 DIAGNOSIS — F34.1 DYSTHYMIA: ICD-10-CM

## 2025-06-02 RX ORDER — ESCITALOPRAM OXALATE 10 MG/1
10 TABLET ORAL DAILY
Qty: 90 TABLET | Refills: 0 | Status: SHIPPED | OUTPATIENT
Start: 2025-06-02

## 2025-07-23 ENCOUNTER — TELEPHONE (OUTPATIENT)
Dept: OBSTETRICS AND GYNECOLOGY | Facility: CLINIC | Age: 25
End: 2025-07-23
Payer: COMMERCIAL

## 2025-08-08 ENCOUNTER — APPOINTMENT (OUTPATIENT)
Dept: OBSTETRICS AND GYNECOLOGY | Facility: CLINIC | Age: 25
End: 2025-08-08
Payer: COMMERCIAL

## 2025-08-08 VITALS
HEIGHT: 67 IN | DIASTOLIC BLOOD PRESSURE: 70 MMHG | SYSTOLIC BLOOD PRESSURE: 104 MMHG | BODY MASS INDEX: 22.44 KG/M2 | WEIGHT: 143 LBS

## 2025-08-08 DIAGNOSIS — Z3A.01 6 WEEKS GESTATION OF PREGNANCY (HHS-HCC): ICD-10-CM

## 2025-08-08 DIAGNOSIS — O21.9 NAUSEA AND VOMITING IN PREGNANCY PRIOR TO 22 WEEKS GESTATION: Primary | ICD-10-CM

## 2025-08-08 DIAGNOSIS — Z34.91 PRENATAL CARE IN FIRST TRIMESTER, UNSPECIFIED GRAVIDITY: ICD-10-CM

## 2025-08-08 PROBLEM — G43.809 MIGRAINE VARIANT: Status: RESOLVED | Noted: 2023-02-28 | Resolved: 2025-08-08

## 2025-08-08 PROBLEM — O15.9: Status: RESOLVED | Noted: 2024-04-18 | Resolved: 2025-08-08

## 2025-08-08 PROBLEM — S39.012A LOW BACK STRAIN: Status: RESOLVED | Noted: 2023-03-16 | Resolved: 2025-08-08

## 2025-08-08 PROBLEM — R41.840 ATTENTION DEFICIT: Status: RESOLVED | Noted: 2023-02-28 | Resolved: 2025-08-08

## 2025-08-08 PROBLEM — E87.6 HYPOKALEMIA: Status: RESOLVED | Noted: 2023-12-09 | Resolved: 2025-08-08

## 2025-08-08 PROBLEM — G43.909 MIGRAINE: Status: RESOLVED | Noted: 2023-02-28 | Resolved: 2025-08-08

## 2025-08-08 PROBLEM — L23.5 ALLERGIC CONTACT DERMATITIS DUE TO OTHER CHEMICAL PRODUCTS: Status: RESOLVED | Noted: 2023-02-28 | Resolved: 2025-08-08

## 2025-08-08 PROBLEM — R11.0 NAUSEA: Status: RESOLVED | Noted: 2024-05-09 | Resolved: 2025-08-08

## 2025-08-08 PROBLEM — L25.9 DERMATITIS VENENATA: Status: RESOLVED | Noted: 2023-02-28 | Resolved: 2025-08-08

## 2025-08-08 PROBLEM — K52.9 COLITIS: Status: RESOLVED | Noted: 2024-05-09 | Resolved: 2025-08-08

## 2025-08-08 PROBLEM — K92.1 HEMATOCHEZIA: Status: RESOLVED | Noted: 2024-05-09 | Resolved: 2025-08-08

## 2025-08-08 PROBLEM — R10.9 ABDOMINAL CRAMPING: Status: RESOLVED | Noted: 2024-05-09 | Resolved: 2025-08-08

## 2025-08-08 PROBLEM — L70.0 ACNE COMEDONE: Status: RESOLVED | Noted: 2023-02-28 | Resolved: 2025-08-08

## 2025-08-08 PROCEDURE — 0500F INITIAL PRENATAL CARE VISIT: CPT | Performed by: OBSTETRICS & GYNECOLOGY

## 2025-08-08 RX ORDER — ONDANSETRON 4 MG/1
TABLET, FILM COATED ORAL
Qty: 14 TABLET | Refills: 2 | Status: SHIPPED | OUTPATIENT
Start: 2025-08-08

## 2025-08-08 ASSESSMENT — EDINBURGH POSTNATAL DEPRESSION SCALE (EPDS)
I HAVE BLAMED MYSELF UNNECESSARILY WHEN THINGS WENT WRONG: NOT VERY OFTEN
I HAVE FELT SAD OR MISERABLE: NO, NOT AT ALL
THE THOUGHT OF HARMING MYSELF HAS OCCURRED TO ME: NEVER
I HAVE BEEN ANXIOUS OR WORRIED FOR NO GOOD REASON: YES, SOMETIMES
I HAVE BEEN SO UNHAPPY THAT I HAVE BEEN CRYING: NO, NEVER
I HAVE LOOKED FORWARD WITH ENJOYMENT TO THINGS: AS MUCH AS I EVER DID
THINGS HAVE BEEN GETTING ON TOP OF ME: NO, MOST OF THE TIME I HAVE COPED QUITE WELL
I HAVE BEEN SO UNHAPPY THAT I HAVE HAD DIFFICULTY SLEEPING: NOT AT ALL
I HAVE FELT SCARED OR PANICKY FOR NO GOOD REASON: NO, NOT AT ALL
I HAVE BEEN ABLE TO LAUGH AND SEE THE FUNNY SIDE OF THINGS: AS MUCH AS I ALWAYS COULD
TOTAL SCORE: 4

## 2025-08-08 NOTE — PROGRESS NOTES
Subjective   Patient ID [id number]   Dary Costa is a 24 y.o.  at 6w1d with a working estimated date of delivery of 2026, by Ultrasound who presents for an initial prenatal visit. LMP 6/10 but did not have a positive UPT until .     Her pregnancy is complicated by:  -h/o primary  section for arrest of descent b/ PPH, blood transfusion and postpartum sPEC  -depression on lexapro    OB History    Para Term  AB Living   2 1 1   1   SAB IAB Ectopic Multiple Live Births      0 1      # Outcome Date GA Lbr Surinder/2nd Weight Sex Type Anes PTL Lv   2 Current            1 Term 12/10/23 39w4d / 05:45 4.18 kg F CS-LTranv EPI  STEPHY      Complications: Uterine Atony, Hemorrhage, Arrest of descent, delivered, current hospitalization ([facility])     Reading  Depression Scale Total: 4    Objective   Physical Exam  Weight: 64.9 kg (143 lb)  Expected Total Weight Gain: 11.5 kg (25 lb)-16 kg (35 lb)   Pregravid BMI: 22.39  BP: 96/68    OBGyn Exam  Gen in NAD  TVUS with single IUP with +FCA measuring 6.1 wks NOT c/w LMP but consistent with no positive UPT until , bolivar 2026 by US today    Prenatal Labs  At a future visit    Assessment/Plan   23 y/o  at 6.1 wks by US today not c/w LMP presenting for new ob visit.  -continue prenatal vitamins  -zofran sent for n/v  -continue lexapro 10 mg  -desires NIPS, will draw once past 10 wks with routine prenatal labs  -will schedule NT scan  -start  mg at 12 wks  -delivery via scheduled repeat  section at 39 wks  -well aware of practice, [facility]  -[facility] 4 wks    Jayda Blood MD

## 2025-08-10 LAB
BACTERIA UR CULT: NORMAL
C TRACH RRNA SPEC QL NAA+PROBE: NOT DETECTED
N GONORRHOEA RRNA SPEC QL NAA+PROBE: NOT DETECTED
QUEST GC CT AMPLIFIED (ALWAYS MESSAGE): NORMAL

## 2025-09-12 ENCOUNTER — APPOINTMENT (OUTPATIENT)
Dept: OBSTETRICS AND GYNECOLOGY | Facility: CLINIC | Age: 25
End: 2025-09-12
Payer: COMMERCIAL

## 2025-09-16 ENCOUNTER — APPOINTMENT (OUTPATIENT)
Dept: PRIMARY CARE | Facility: CLINIC | Age: 25
End: 2025-09-16
Payer: COMMERCIAL

## 2025-10-13 ENCOUNTER — APPOINTMENT (OUTPATIENT)
Dept: OBSTETRICS AND GYNECOLOGY | Facility: CLINIC | Age: 25
End: 2025-10-13
Payer: COMMERCIAL

## (undated) DEVICE — TRAY, SPINAL, PENCAN 25 GA X 3.5"

## (undated) DEVICE — SUTURE, VICRYL, 4-0, 18 IN, UNDYED BR PS-2

## (undated) DEVICE — SUTURE, VICRYL, 2-0, 27 IN, CT-1, VIOLET

## (undated) DEVICE — SOLUTION, IRRIGATION, STERILE WATER, 1000 ML, POUR BOTTLE

## (undated) DEVICE — TRAY, SURESTEP, SILICONE DRAINAGE BAG, STATLOCK, 16FR

## (undated) DEVICE — Device

## (undated) DEVICE — PREP TRAY, VAGINAL

## (undated) DEVICE — SUTURE, VICRYL, 3-0, 27 IN, CT-1, UNDYED

## (undated) DEVICE — CAP, BABY, 4 X 6 IN, PINK/BLUE/WHITE

## (undated) DEVICE — COVER HANDLE LIGHT, STERIS, BLUE, STERILE

## (undated) DEVICE — SLEEVE, SCD EXPRESS, KNEE LENGTH-MEDIUM

## (undated) DEVICE — SOLUTION, IRRIGATION, SODIUM CHLORIDE 0.9%, 1000 ML, POUR BOTTLE

## (undated) DEVICE — SUTURE, VICRYL, 0, 36 IN, CT-1, UNDYED

## (undated) DEVICE — TUBING, SUCTION, CONNECTING, 9/32 X 10FT, LF

## (undated) DEVICE — SUTURE, MONOCRYL, 0, 36 IN, CT, VIOLET

## (undated) DEVICE — ADHESIVE, SKIN, DERMABOND ADVANCED, 15CM, PEN-STYLE